# Patient Record
Sex: MALE | Race: WHITE | NOT HISPANIC OR LATINO | Employment: OTHER | URBAN - METROPOLITAN AREA
[De-identification: names, ages, dates, MRNs, and addresses within clinical notes are randomized per-mention and may not be internally consistent; named-entity substitution may affect disease eponyms.]

---

## 2017-02-07 DIAGNOSIS — M10.9 GOUTY ARTHROPATHY: ICD-10-CM

## 2017-02-07 RX ORDER — PREDNISONE 10 MG/1
TABLET ORAL
Qty: 40 TABLET | Refills: 0 | Status: SHIPPED | OUTPATIENT
Start: 2017-02-07 | End: 2017-03-16 | Stop reason: SDUPTHER

## 2017-03-07 DIAGNOSIS — M10.9 GOUTY ARTHROPATHY: ICD-10-CM

## 2017-03-08 RX ORDER — PREDNISONE 10 MG/1
TABLET ORAL
Qty: 40 TABLET | Refills: 0 | OUTPATIENT
Start: 2017-03-08

## 2017-03-16 DIAGNOSIS — M10.9 GOUTY ARTHROPATHY: ICD-10-CM

## 2017-03-16 RX ORDER — PREDNISONE 10 MG/1
TABLET ORAL
Qty: 40 TABLET | Refills: 0 | Status: SHIPPED | OUTPATIENT
Start: 2017-03-16 | End: 2017-07-12 | Stop reason: SDUPTHER

## 2017-03-16 NOTE — TELEPHONE ENCOUNTER
Patient states his gout has flares up very painful and swollen unable to walk on his foot requesting a refill on his Prednisone. Please advised.

## 2017-07-12 ENCOUNTER — OFFICE VISIT (OUTPATIENT)
Dept: FAMILY MEDICINE | Facility: CLINIC | Age: 37
End: 2017-07-12
Payer: COMMERCIAL

## 2017-07-12 VITALS
HEIGHT: 73 IN | WEIGHT: 268.5 LBS | TEMPERATURE: 98 F | BODY MASS INDEX: 35.59 KG/M2 | RESPIRATION RATE: 16 BRPM | DIASTOLIC BLOOD PRESSURE: 108 MMHG | OXYGEN SATURATION: 97 % | SYSTOLIC BLOOD PRESSURE: 144 MMHG | HEART RATE: 84 BPM

## 2017-07-12 DIAGNOSIS — M10.9 GOUTY ARTHROPATHY: ICD-10-CM

## 2017-07-12 DIAGNOSIS — M1A.0710 IDIOPATHIC CHRONIC GOUT OF RIGHT FOOT WITHOUT TOPHUS: Primary | ICD-10-CM

## 2017-07-12 DIAGNOSIS — R03.0 ELEVATED BP WITHOUT DIAGNOSIS OF HYPERTENSION: ICD-10-CM

## 2017-07-12 DIAGNOSIS — F51.01 PRIMARY INSOMNIA: ICD-10-CM

## 2017-07-12 PROCEDURE — 99999 PR PBB SHADOW E&M-EST. PATIENT-LVL III: CPT | Mod: PBBFAC,,, | Performed by: FAMILY MEDICINE

## 2017-07-12 PROCEDURE — 99214 OFFICE O/P EST MOD 30 MIN: CPT | Mod: S$GLB,,, | Performed by: FAMILY MEDICINE

## 2017-07-12 RX ORDER — COLCHICINE 0.6 MG/1
1 CAPSULE ORAL 2 TIMES DAILY
Refills: 11 | COMMUNITY
Start: 2017-05-24 | End: 2017-10-02 | Stop reason: SDUPTHER

## 2017-07-12 RX ORDER — FEBUXOSTAT 40 MG/1
40 TABLET ORAL DAILY
Refills: 12 | COMMUNITY
Start: 2017-05-24 | End: 2017-10-02 | Stop reason: SDUPTHER

## 2017-07-12 RX ORDER — TEMAZEPAM 30 MG/1
CAPSULE ORAL
Qty: 30 CAPSULE | Refills: 5 | Status: SHIPPED | OUTPATIENT
Start: 2017-07-12 | End: 2018-01-15 | Stop reason: SDUPTHER

## 2017-07-12 RX ORDER — PREDNISONE 10 MG/1
TABLET ORAL
Qty: 40 TABLET | Refills: 0 | Status: SHIPPED | OUTPATIENT
Start: 2017-07-12 | End: 2017-08-31

## 2017-07-12 RX ORDER — LOSARTAN POTASSIUM 25 MG/1
25 TABLET ORAL DAILY
Qty: 90 TABLET | Refills: 3 | Status: SHIPPED | OUTPATIENT
Start: 2017-07-12 | End: 2017-08-31

## 2017-07-12 NOTE — PROGRESS NOTES
Chief Complaint   Patient presents with    Gout     need medication refill        HPI  Keith Sommers Jr. is a 37 y.o. male with multiple medical diagnoses as listed in the medical history and problem list that presents to establish care.    Gout - states chronic, states small amounts of seafood causes flare ups, currently R ankle medial.     Anxiety - increased stressors with family, relationship.    Fam hx - HTN, CAD, no DM2, Lung CA, ovarian CA    Pt is known to me and was last seen by me on Visit date not found.    PAST MEDICAL HISTORY:  Past Medical History:   Diagnosis Date    Gout        PAST SURGICAL HISTORY:  Past Surgical History:   Procedure Laterality Date    HERNIA REPAIR         SOCIAL HISTORY:  Social History     Social History    Marital status: Single     Spouse name: N/A    Number of children: N/A    Years of education: N/A     Occupational History    Not on file.     Social History Main Topics    Smoking status: Never Smoker    Smokeless tobacco: Not on file    Alcohol use Yes      Comment: social. no alcohol tonight    Drug use: No    Sexual activity: Not on file     Other Topics Concern    Not on file     Social History Narrative    No narrative on file       FAMILY HISTORY:  No family history on file.    ALLERGIES AND MEDICATIONS: updated and reviewed.  Review of patient's allergies indicates:  No Known Allergies  Current Outpatient Prescriptions   Medication Sig Dispense Refill    MITIGARE 0.6 mg Cap Take 1 capsule by mouth 2 (two) times daily.  11    ULORIC 40 mg Tab Take 40 mg by mouth once daily.  12    colchicine 0.6 mg tablet Take 1 tablet (0.6 mg total) by mouth 2 (two) times daily. 60 tablet 11    ibuprofen (ADVIL,MOTRIN) 600 MG tablet Take 1 tablet (600 mg total) by mouth every 8 (eight) hours as needed for Pain. 20 tablet 0    losartan (COZAAR) 25 MG tablet Take 1 tablet (25 mg total) by mouth once daily. 90 tablet 3    predniSONE (DELTASONE) 10 MG tablet  "TAKE 3 TABLETS TWICE DAILY X 3 DAYS, THEN 2 TABLETS TWICE DAILY X 3 DAYS, THEN 1 TABLET TWICE DAILY X 3 DAYS,THEN 1 TABLET DAILY 40 tablet 0    temazepam (RESTORIL) 30 mg capsule TK 1 C PO NIGHTLY PRF INSOMNIA 30 capsule 5     No current facility-administered medications for this visit.        ROS  Review of Systems   Constitutional: Negative for activity change, fatigue and fever.   HENT: Negative for congestion, rhinorrhea and sore throat.    Eyes: Negative for visual disturbance.   Respiratory: Negative for cough, chest tightness and shortness of breath.    Cardiovascular: Negative for chest pain.   Gastrointestinal: Negative for abdominal pain, diarrhea, nausea and vomiting.   Genitourinary: Negative for dysuria, frequency and urgency.   Musculoskeletal: Positive for arthralgias. Negative for back pain and myalgias.   Skin: Negative for rash.   Neurological: Negative for dizziness, syncope and numbness.   Psychiatric/Behavioral: Negative for agitation. The patient is nervous/anxious.        Physical Exam  Vitals:    07/12/17 1545   BP: (!) 144/108   Pulse: 84   Resp: 16   Temp: 98.1 °F (36.7 °C)    Body mass index is 35.02 kg/m².  Weight: 121.8 kg (268 lb 8.3 oz)   Height: 6' 1.43" (186.5 cm)     Physical Exam   Constitutional: He is oriented to person, place, and time. He appears well-developed and well-nourished.   HENT:   Head: Normocephalic and atraumatic.   Eyes: Conjunctivae and EOM are normal. Pupils are equal, round, and reactive to light.   Neck: Normal range of motion. Neck supple.   Cardiovascular: Normal rate, regular rhythm and normal heart sounds.    Pulmonary/Chest: Effort normal and breath sounds normal.   Abdominal: Soft. Bowel sounds are normal.   Musculoskeletal: Normal range of motion.   Mild LE edema, ankle   Neurological: He is alert and oriented to person, place, and time. He has normal reflexes.   Skin: Skin is warm and dry.   Psychiatric: He has a normal mood and affect. His behavior is " normal. Judgment and thought content normal.       Health Maintenance       Date Due Completion Date    TETANUS VACCINE 06/17/1999 ---    Influenza Vaccine 08/01/2017 ---    Lipid Panel 10/18/2021 10/18/2016          Assessment & Plan    Idiopathic chronic gout of right foot without tophus, Gouty arthropathy  -     predniSONE (DELTASONE) 10 MG tablet; TAKE 3 TABLETS TWICE DAILY X 3 DAYS, THEN 2 TABLETS TWICE DAILY X 3 DAYS, THEN 1 TABLET TWICE DAILY X 3 DAYS,THEN 1 TABLET DAILY  Dispense: 40 tablet; Refill: 0    Primary insomnia  -     temazepam (RESTORIL) 30 mg capsule; TK 1 C PO NIGHTLY PRF INSOMNIA  Dispense: 30 capsule; Refill: 5  - Refilled previous medications    Elevated BP without diagnosis of hypertension  -     losartan (COZAAR) 25 MG tablet; Take 1 tablet (25 mg total) by mouth once daily.  Dispense: 90 tablet; Refill: 3  - Begin therapy, strong family hx of HTN, cardiac complications  - Discussed lifestyle mods        Return in about 4 weeks (around 8/9/2017).

## 2017-08-31 ENCOUNTER — HOSPITAL ENCOUNTER (OUTPATIENT)
Facility: HOSPITAL | Age: 37
Discharge: HOME | End: 2017-09-01
Attending: EMERGENCY MEDICINE | Admitting: SURGERY
Payer: COMMERCIAL

## 2017-08-31 ENCOUNTER — ANESTHESIA EVENT (OUTPATIENT)
Dept: SURGERY | Facility: HOSPITAL | Age: 37
End: 2017-08-31
Payer: COMMERCIAL

## 2017-08-31 ENCOUNTER — ANESTHESIA (OUTPATIENT)
Dept: SURGERY | Facility: HOSPITAL | Age: 37
End: 2017-08-31
Payer: COMMERCIAL

## 2017-08-31 DIAGNOSIS — K81.9 CHOLECYSTITIS: Primary | ICD-10-CM

## 2017-08-31 LAB
ALBUMIN SERPL BCP-MCNC: 4.3 G/DL
ALP SERPL-CCNC: 57 U/L
ALT SERPL W/O P-5'-P-CCNC: 49 U/L
ANION GAP SERPL CALC-SCNC: 9 MMOL/L
AST SERPL-CCNC: 27 U/L
BASOPHILS # BLD AUTO: 0.03 K/UL
BASOPHILS NFR BLD: 0.3 %
BILIRUB SERPL-MCNC: 1.4 MG/DL
BILIRUB UR QL STRIP: NEGATIVE
BUN SERPL-MCNC: 10 MG/DL
CALCIUM SERPL-MCNC: 9.6 MG/DL
CHLORIDE SERPL-SCNC: 104 MMOL/L
CLARITY UR: CLEAR
CO2 SERPL-SCNC: 28 MMOL/L
COLOR UR: YELLOW
CREAT SERPL-MCNC: 0.9 MG/DL
DIFFERENTIAL METHOD: ABNORMAL
EOSINOPHIL # BLD AUTO: 0.1 K/UL
EOSINOPHIL NFR BLD: 0.6 %
ERYTHROCYTE [DISTWIDTH] IN BLOOD BY AUTOMATED COUNT: 12 %
EST. GFR  (AFRICAN AMERICAN): >60 ML/MIN/1.73 M^2
EST. GFR  (NON AFRICAN AMERICAN): >60 ML/MIN/1.73 M^2
GLUCOSE SERPL-MCNC: 99 MG/DL
GLUCOSE UR QL STRIP: NEGATIVE
HCT VFR BLD AUTO: 41.9 %
HGB BLD-MCNC: 14.7 G/DL
HGB UR QL STRIP: NEGATIVE
KETONES UR QL STRIP: NEGATIVE
LEUKOCYTE ESTERASE UR QL STRIP: NEGATIVE
LIPASE SERPL-CCNC: 12 U/L
LYMPHOCYTES # BLD AUTO: 1.4 K/UL
LYMPHOCYTES NFR BLD: 13.1 %
MCH RBC QN AUTO: 32.8 PG
MCHC RBC AUTO-ENTMCNC: 35.1 G/DL
MCV RBC AUTO: 94 FL
MONOCYTES # BLD AUTO: 1.2 K/UL
MONOCYTES NFR BLD: 11.4 %
NEUTROPHILS # BLD AUTO: 8 K/UL
NEUTROPHILS NFR BLD: 74.6 %
NITRITE UR QL STRIP: NEGATIVE
PH UR STRIP: 7 [PH] (ref 5–8)
PLATELET # BLD AUTO: 139 K/UL
PMV BLD AUTO: 12.8 FL
POTASSIUM SERPL-SCNC: 4.2 MMOL/L
PROT SERPL-MCNC: 7.2 G/DL
PROT UR QL STRIP: NEGATIVE
RBC # BLD AUTO: 4.48 M/UL
SODIUM SERPL-SCNC: 141 MMOL/L
SP GR UR STRIP: 1.02 (ref 1–1.03)
URN SPEC COLLECT METH UR: ABNORMAL
UROBILINOGEN UR STRIP-ACNC: ABNORMAL EU/DL
WBC # BLD AUTO: 10.74 K/UL

## 2017-08-31 PROCEDURE — 83690 ASSAY OF LIPASE: CPT

## 2017-08-31 PROCEDURE — G0378 HOSPITAL OBSERVATION PER HR: HCPCS

## 2017-08-31 PROCEDURE — 96365 THER/PROPH/DIAG IV INF INIT: CPT

## 2017-08-31 PROCEDURE — 80053 COMPREHEN METABOLIC PANEL: CPT

## 2017-08-31 PROCEDURE — 96361 HYDRATE IV INFUSION ADD-ON: CPT

## 2017-08-31 PROCEDURE — 85025 COMPLETE CBC W/AUTO DIFF WBC: CPT

## 2017-08-31 PROCEDURE — 81003 URINALYSIS AUTO W/O SCOPE: CPT

## 2017-08-31 PROCEDURE — 63600175 PHARM REV CODE 636 W HCPCS

## 2017-08-31 PROCEDURE — 96374 THER/PROPH/DIAG INJ IV PUSH: CPT | Mod: 59

## 2017-08-31 PROCEDURE — 63600175 PHARM REV CODE 636 W HCPCS: Performed by: NURSE PRACTITIONER

## 2017-08-31 PROCEDURE — 27201423 OPTIME MED/SURG SUP & DEVICES STERILE SUPPLY: Performed by: SURGERY

## 2017-08-31 PROCEDURE — 36000708 HC OR TIME LEV III 1ST 15 MIN: Performed by: SURGERY

## 2017-08-31 PROCEDURE — 25000003 PHARM REV CODE 250: Performed by: STUDENT IN AN ORGANIZED HEALTH CARE EDUCATION/TRAINING PROGRAM

## 2017-08-31 PROCEDURE — 71000033 HC RECOVERY, INTIAL HOUR: Performed by: SURGERY

## 2017-08-31 PROCEDURE — 88304 TISSUE EXAM BY PATHOLOGIST: CPT | Performed by: PATHOLOGY

## 2017-08-31 PROCEDURE — 99285 EMERGENCY DEPT VISIT HI MDM: CPT | Mod: 25

## 2017-08-31 PROCEDURE — 36000709 HC OR TIME LEV III EA ADD 15 MIN: Performed by: SURGERY

## 2017-08-31 PROCEDURE — 63600175 PHARM REV CODE 636 W HCPCS: Performed by: SURGERY

## 2017-08-31 PROCEDURE — 37000008 HC ANESTHESIA 1ST 15 MINUTES: Performed by: SURGERY

## 2017-08-31 PROCEDURE — D9220A PRA ANESTHESIA: Mod: CRNA,,, | Performed by: NURSE ANESTHETIST, CERTIFIED REGISTERED

## 2017-08-31 PROCEDURE — 63600175 PHARM REV CODE 636 W HCPCS: Performed by: STUDENT IN AN ORGANIZED HEALTH CARE EDUCATION/TRAINING PROGRAM

## 2017-08-31 PROCEDURE — 71000039 HC RECOVERY, EACH ADD'L HOUR: Performed by: SURGERY

## 2017-08-31 PROCEDURE — 25000003 PHARM REV CODE 250: Performed by: NURSE PRACTITIONER

## 2017-08-31 PROCEDURE — 25000003 PHARM REV CODE 250: Performed by: NURSE ANESTHETIST, CERTIFIED REGISTERED

## 2017-08-31 PROCEDURE — 94799 UNLISTED PULMONARY SVC/PX: CPT

## 2017-08-31 PROCEDURE — 96375 TX/PRO/DX INJ NEW DRUG ADDON: CPT

## 2017-08-31 PROCEDURE — 63600175 PHARM REV CODE 636 W HCPCS: Performed by: NURSE ANESTHETIST, CERTIFIED REGISTERED

## 2017-08-31 PROCEDURE — D9220A PRA ANESTHESIA: Mod: ANES,,, | Performed by: ANESTHESIOLOGY

## 2017-08-31 PROCEDURE — 88304 TISSUE EXAM BY PATHOLOGIST: CPT | Mod: 26,,, | Performed by: PATHOLOGY

## 2017-08-31 PROCEDURE — 37000009 HC ANESTHESIA EA ADD 15 MINS: Performed by: SURGERY

## 2017-08-31 PROCEDURE — C9290 INJ, BUPIVACAINE LIPOSOME: HCPCS | Performed by: SURGERY

## 2017-08-31 PROCEDURE — 63600175 PHARM REV CODE 636 W HCPCS: Performed by: ANESTHESIOLOGY

## 2017-08-31 PROCEDURE — 25000003 PHARM REV CODE 250: Performed by: SURGERY

## 2017-08-31 PROCEDURE — S0028 INJECTION, FAMOTIDINE, 20 MG: HCPCS | Performed by: STUDENT IN AN ORGANIZED HEALTH CARE EDUCATION/TRAINING PROGRAM

## 2017-08-31 RX ORDER — MORPHINE SULFATE 10 MG/ML
2 INJECTION INTRAMUSCULAR; INTRAVENOUS; SUBCUTANEOUS
Status: DISCONTINUED | OUTPATIENT
Start: 2017-08-31 | End: 2017-09-01 | Stop reason: HOSPADM

## 2017-08-31 RX ORDER — DEXTROSE MONOHYDRATE, SODIUM CHLORIDE, AND POTASSIUM CHLORIDE 50; 1.49; 4.5 G/1000ML; G/1000ML; G/1000ML
INJECTION, SOLUTION INTRAVENOUS CONTINUOUS
Status: DISCONTINUED | OUTPATIENT
Start: 2017-08-31 | End: 2017-09-01

## 2017-08-31 RX ORDER — SODIUM CHLORIDE, SODIUM LACTATE, POTASSIUM CHLORIDE, CALCIUM CHLORIDE 600; 310; 30; 20 MG/100ML; MG/100ML; MG/100ML; MG/100ML
INJECTION, SOLUTION INTRAVENOUS CONTINUOUS PRN
Status: DISCONTINUED | OUTPATIENT
Start: 2017-08-31 | End: 2017-08-31

## 2017-08-31 RX ORDER — MORPHINE SULFATE 10 MG/ML
4 INJECTION INTRAMUSCULAR; INTRAVENOUS; SUBCUTANEOUS
Status: DISCONTINUED | OUTPATIENT
Start: 2017-08-31 | End: 2017-08-31

## 2017-08-31 RX ORDER — PROPOFOL 10 MG/ML
VIAL (ML) INTRAVENOUS
Status: DISCONTINUED | OUTPATIENT
Start: 2017-08-31 | End: 2017-08-31

## 2017-08-31 RX ORDER — LOSARTAN POTASSIUM 25 MG/1
25 TABLET ORAL DAILY
Status: DISCONTINUED | OUTPATIENT
Start: 2017-08-31 | End: 2017-09-01 | Stop reason: HOSPADM

## 2017-08-31 RX ORDER — METOCLOPRAMIDE HYDROCHLORIDE 5 MG/ML
10 INJECTION INTRAMUSCULAR; INTRAVENOUS EVERY 10 MIN PRN
Status: DISCONTINUED | OUTPATIENT
Start: 2017-08-31 | End: 2017-08-31 | Stop reason: HOSPADM

## 2017-08-31 RX ORDER — LIDOCAINE HCL/PF 100 MG/5ML
SYRINGE (ML) INTRAVENOUS
Status: DISCONTINUED | OUTPATIENT
Start: 2017-08-31 | End: 2017-08-31

## 2017-08-31 RX ORDER — HYDROMORPHONE HYDROCHLORIDE 2 MG/ML
0.2 INJECTION, SOLUTION INTRAMUSCULAR; INTRAVENOUS; SUBCUTANEOUS EVERY 5 MIN PRN
Status: DISCONTINUED | OUTPATIENT
Start: 2017-08-31 | End: 2017-08-31 | Stop reason: HOSPADM

## 2017-08-31 RX ORDER — NEOSTIGMINE METHYLSULFATE 1 MG/ML
INJECTION, SOLUTION INTRAVENOUS
Status: DISCONTINUED | OUTPATIENT
Start: 2017-08-31 | End: 2017-08-31

## 2017-08-31 RX ORDER — HYDRALAZINE HYDROCHLORIDE 20 MG/ML
10 INJECTION INTRAMUSCULAR; INTRAVENOUS ONCE
Status: COMPLETED | OUTPATIENT
Start: 2017-08-31 | End: 2017-08-31

## 2017-08-31 RX ORDER — ONDANSETRON 2 MG/ML
INJECTION INTRAMUSCULAR; INTRAVENOUS
Status: DISCONTINUED | OUTPATIENT
Start: 2017-08-31 | End: 2017-08-31

## 2017-08-31 RX ORDER — OXYCODONE AND ACETAMINOPHEN 5; 325 MG/1; MG/1
1 TABLET ORAL EVERY 4 HOURS PRN
Status: DISCONTINUED | OUTPATIENT
Start: 2017-08-31 | End: 2017-09-01 | Stop reason: HOSPADM

## 2017-08-31 RX ORDER — GLYCOPYRROLATE 0.2 MG/ML
INJECTION INTRAMUSCULAR; INTRAVENOUS
Status: DISCONTINUED | OUTPATIENT
Start: 2017-08-31 | End: 2017-08-31

## 2017-08-31 RX ORDER — FEBUXOSTAT 40 MG/1
40 TABLET, FILM COATED ORAL DAILY
Status: DISCONTINUED | OUTPATIENT
Start: 2017-08-31 | End: 2017-09-01 | Stop reason: HOSPADM

## 2017-08-31 RX ORDER — KETOROLAC TROMETHAMINE 30 MG/ML
INJECTION, SOLUTION INTRAMUSCULAR; INTRAVENOUS
Status: DISCONTINUED | OUTPATIENT
Start: 2017-08-31 | End: 2017-08-31

## 2017-08-31 RX ORDER — SODIUM CHLORIDE 9 MG/ML
1000 INJECTION, SOLUTION INTRAVENOUS
Status: COMPLETED | OUTPATIENT
Start: 2017-08-31 | End: 2017-08-31

## 2017-08-31 RX ORDER — MEPERIDINE HYDROCHLORIDE 50 MG/ML
12.5 INJECTION INTRAMUSCULAR; INTRAVENOUS; SUBCUTANEOUS ONCE AS NEEDED
Status: COMPLETED | OUTPATIENT
Start: 2017-08-31 | End: 2017-08-31

## 2017-08-31 RX ORDER — FAMOTIDINE 10 MG/ML
20 INJECTION INTRAVENOUS 2 TIMES DAILY
Status: DISCONTINUED | OUTPATIENT
Start: 2017-08-31 | End: 2017-09-01 | Stop reason: HOSPADM

## 2017-08-31 RX ORDER — ROCURONIUM BROMIDE 10 MG/ML
INJECTION, SOLUTION INTRAVENOUS
Status: DISCONTINUED | OUTPATIENT
Start: 2017-08-31 | End: 2017-08-31

## 2017-08-31 RX ORDER — ONDANSETRON 2 MG/ML
4 INJECTION INTRAMUSCULAR; INTRAVENOUS ONCE
Status: COMPLETED | OUTPATIENT
Start: 2017-08-31 | End: 2017-08-31

## 2017-08-31 RX ORDER — ACETAMINOPHEN 10 MG/ML
INJECTION, SOLUTION INTRAVENOUS
Status: DISCONTINUED | OUTPATIENT
Start: 2017-08-31 | End: 2017-08-31

## 2017-08-31 RX ORDER — ONDANSETRON 2 MG/ML
4 INJECTION INTRAMUSCULAR; INTRAVENOUS EVERY 6 HOURS PRN
Status: DISCONTINUED | OUTPATIENT
Start: 2017-08-31 | End: 2017-09-01 | Stop reason: HOSPADM

## 2017-08-31 RX ORDER — OXYCODONE AND ACETAMINOPHEN 5; 325 MG/1; MG/1
1 TABLET ORAL
Status: DISCONTINUED | OUTPATIENT
Start: 2017-08-31 | End: 2017-08-31 | Stop reason: HOSPADM

## 2017-08-31 RX ORDER — MIDAZOLAM HYDROCHLORIDE 1 MG/ML
INJECTION, SOLUTION INTRAMUSCULAR; INTRAVENOUS
Status: DISCONTINUED | OUTPATIENT
Start: 2017-08-31 | End: 2017-08-31

## 2017-08-31 RX ORDER — ONDANSETRON 2 MG/ML
4 INJECTION INTRAMUSCULAR; INTRAVENOUS
Status: COMPLETED | OUTPATIENT
Start: 2017-08-31 | End: 2017-08-31

## 2017-08-31 RX ORDER — BUPIVACAINE HYDROCHLORIDE 2.5 MG/ML
INJECTION, SOLUTION EPIDURAL; INFILTRATION; INTRACAUDAL
Status: DISCONTINUED | OUTPATIENT
Start: 2017-08-31 | End: 2017-08-31 | Stop reason: HOSPADM

## 2017-08-31 RX ORDER — SODIUM CHLORIDE 0.9 % (FLUSH) 0.9 %
3 SYRINGE (ML) INJECTION
Status: DISCONTINUED | OUTPATIENT
Start: 2017-08-31 | End: 2017-09-01 | Stop reason: HOSPADM

## 2017-08-31 RX ORDER — ENOXAPARIN SODIUM 100 MG/ML
40 INJECTION SUBCUTANEOUS EVERY 24 HOURS
Status: DISCONTINUED | OUTPATIENT
Start: 2017-09-01 | End: 2017-09-01 | Stop reason: HOSPADM

## 2017-08-31 RX ORDER — FENTANYL CITRATE 50 UG/ML
INJECTION, SOLUTION INTRAMUSCULAR; INTRAVENOUS
Status: DISCONTINUED | OUTPATIENT
Start: 2017-08-31 | End: 2017-08-31

## 2017-08-31 RX ORDER — MORPHINE SULFATE 10 MG/ML
6 INJECTION INTRAMUSCULAR; INTRAVENOUS; SUBCUTANEOUS
Status: COMPLETED | OUTPATIENT
Start: 2017-08-31 | End: 2017-08-31

## 2017-08-31 RX ORDER — ONDANSETRON 2 MG/ML
INJECTION INTRAMUSCULAR; INTRAVENOUS
Status: COMPLETED
Start: 2017-08-31 | End: 2017-08-31

## 2017-08-31 RX ADMIN — FENTANYL CITRATE 50 MCG: 50 INJECTION INTRAMUSCULAR; INTRAVENOUS at 02:08

## 2017-08-31 RX ADMIN — MEPERIDINE HYDROCHLORIDE 12.5 MG: 50 INJECTION INTRAMUSCULAR; INTRAVENOUS; SUBCUTANEOUS at 02:08

## 2017-08-31 RX ADMIN — KETOROLAC TROMETHAMINE 30 MG: 30 INJECTION, SOLUTION INTRAMUSCULAR; INTRAVENOUS at 02:08

## 2017-08-31 RX ADMIN — NEOSTIGMINE METHYLSULFATE 5 MG: 1 INJECTION INTRAVENOUS at 02:08

## 2017-08-31 RX ADMIN — HYDRALAZINE HYDROCHLORIDE 10 MG: 20 INJECTION INTRAMUSCULAR; INTRAVENOUS at 03:08

## 2017-08-31 RX ADMIN — FENTANYL CITRATE 50 MCG: 50 INJECTION INTRAMUSCULAR; INTRAVENOUS at 01:08

## 2017-08-31 RX ADMIN — ONDANSETRON 4 MG: 2 INJECTION, SOLUTION INTRAMUSCULAR; INTRAVENOUS at 02:08

## 2017-08-31 RX ADMIN — DEXTROSE, SODIUM CHLORIDE, AND POTASSIUM CHLORIDE: 5; .45; .15 INJECTION INTRAVENOUS at 05:08

## 2017-08-31 RX ADMIN — ONDANSETRON 4 MG: 2 INJECTION INTRAMUSCULAR; INTRAVENOUS at 06:08

## 2017-08-31 RX ADMIN — MIDAZOLAM HYDROCHLORIDE 4 MG: 1 INJECTION, SOLUTION INTRAMUSCULAR; INTRAVENOUS at 01:08

## 2017-08-31 RX ADMIN — SODIUM CHLORIDE 1000 ML: 0.9 INJECTION, SOLUTION INTRAVENOUS at 01:08

## 2017-08-31 RX ADMIN — SODIUM CHLORIDE 1000 ML: 0.9 INJECTION, SOLUTION INTRAVENOUS at 10:08

## 2017-08-31 RX ADMIN — PIPERACILLIN, TAZOBACTAM 4.5 G: 4; .5 INJECTION, POWDER, LYOPHILIZED, FOR SOLUTION INTRAVENOUS at 01:08

## 2017-08-31 RX ADMIN — SODIUM CHLORIDE, SODIUM LACTATE, POTASSIUM CHLORIDE, AND CALCIUM CHLORIDE: .6; .31; .03; .02 INJECTION, SOLUTION INTRAVENOUS at 01:08

## 2017-08-31 RX ADMIN — FENTANYL CITRATE 100 MCG: 50 INJECTION INTRAMUSCULAR; INTRAVENOUS at 01:08

## 2017-08-31 RX ADMIN — LIDOCAINE HYDROCHLORIDE 75 MG: 20 INJECTION, SOLUTION INTRAVENOUS at 02:08

## 2017-08-31 RX ADMIN — ACETAMINOPHEN 1000 MG: 10 INJECTION, SOLUTION INTRAVENOUS at 02:08

## 2017-08-31 RX ADMIN — ONDANSETRON 4 MG: 2 INJECTION, SOLUTION INTRAMUSCULAR; INTRAVENOUS at 04:08

## 2017-08-31 RX ADMIN — ROCURONIUM BROMIDE 40 MG: 10 INJECTION, SOLUTION INTRAVENOUS at 01:08

## 2017-08-31 RX ADMIN — GLYCOPYRROLATE 0.8 MG: 0.2 INJECTION, SOLUTION INTRAMUSCULAR; INTRAVENOUS at 02:08

## 2017-08-31 RX ADMIN — ONDANSETRON 4 MG: 2 INJECTION INTRAMUSCULAR; INTRAVENOUS at 11:08

## 2017-08-31 RX ADMIN — PROPOFOL 200 MG: 10 INJECTION, EMULSION INTRAVENOUS at 01:08

## 2017-08-31 RX ADMIN — FAMOTIDINE 20 MG: 10 INJECTION, SOLUTION INTRAVENOUS at 10:08

## 2017-08-31 RX ADMIN — LIDOCAINE HYDROCHLORIDE 100 MG: 20 INJECTION, SOLUTION INTRAVENOUS at 01:08

## 2017-08-31 RX ADMIN — ONDANSETRON 4 MG: 2 INJECTION INTRAMUSCULAR; INTRAVENOUS at 04:08

## 2017-08-31 RX ADMIN — MORPHINE SULFATE 6 MG: 10 INJECTION INTRAVENOUS at 11:08

## 2017-08-31 NOTE — OP NOTE
DATE OF PROCEDURE:  08/31/2017    PREOPERATIVE DIAGNOSIS:  Acute cholecystitis.    POSTOPERATIVE DIAGNOSIS:  Acute cholecystitis.    OPERATIVE PROCEDURE:  Laparoscopic cholecystectomy.    SURGEON:  Keith Rascon M.D.    ASSISTANT:  Jeannie.    PROCEDURE IN DETAIL:  After adequate premedication, the patient was taken to the   Operating Room, placed on the operating table in supine position.  General   anesthesia administered via endotracheal tube.  The patient's abdomen was   prepped and draped in sterile fashion.  Infraumbilical incision was made.  The   abdomen was grasped and elevated.  Veress needle inserted.  The abdomen   insufflated with CO2.  The 5-mm trocar introduced.  A laparoscope was placed.    The other 3 trocars placed in the usual position.  Gallbladder was grasped and   elevated, noted to have acute edematous changes.  The gallbladder was aspirated   and clear bile was returned.  At this point, the neck of the gallbladder was   completely skeletonized, identifying cystic duct and cystic artery.  Calot   triangle dissected.  No anatomic abnormalities were noted.  Cystic duct was   triply clipped and divided.  Cystic artery clipped and divided.  The gallbladder   removed from the gallbladder fossa utilizing cautery.  Gallbladder was removed   through the epigastric trocar site.  The operative site was irrigated.    Hemostasis was checked.  The abdomen desufflated.  The wounds closed with 3-0   nylon.  Blood loss was negligible.  The patient tolerated the procedure and was   transported to Recovery in stable condition.      VELVET/IN  dd: 08/31/2017 14:21:34 (CDT)  td: 08/31/2017 18:45:09 (EDINSON)  Doc ID   #[id number]  Job ID #[id number]    CC:

## 2017-08-31 NOTE — TRANSFER OF CARE
"Anesthesia Transfer of Care Note    Patient: Keith Sommers Jr.    Procedure(s) Performed: Procedure(s) (LRB):  CHOLECYSTECTOMY-LAPAROSCOPIC (N/A)    Patient location: PACU    Anesthesia Type: general    Transport from OR: Transported from OR on room air with adequate spontaneous ventilation    Post pain: adequate analgesia    Post assessment: no apparent anesthetic complications and tolerated procedure well    Post vital signs: stable    Level of consciousness: awake, alert and oriented    Nausea/Vomiting: no nausea/vomiting    Complications: none    Transfer of care protocol was followed      Last vitals:   Visit Vitals  BP (!) 159/92 (BP Location: Right arm, Patient Position: Lying)   Pulse 91   Temp 36.9 °C (98.4 °F) (Oral)   Resp 14   Ht 6' 1" (1.854 m)   Wt 117.9 kg (260 lb)   SpO2 100%   BMI 34.30 kg/m²     "

## 2017-08-31 NOTE — ANESTHESIA PREPROCEDURE EVALUATION
08/31/2017  Keith Sommers Jr. is a 37 y.o., male.    Anesthesia Evaluation    I have reviewed the Patient Summary Reports.     I have reviewed the Medications.     Review of Systems  Anesthesia Hx:  No problems with previous Anesthesia    Social:  Non-Smoker, No Alcohol Use    Cardiovascular:   Exercise tolerance: good Hypertension        Physical Exam  General:  Well nourished, Large Beard    Airway/Jaw/Neck:  Airway Findings: Mouth Opening: Normal Tongue: Normal  General Airway Assessment: Adult  Mallampati: II  TM Distance: Normal, at least 6 cm  Jaw/Neck Findings:  Neck ROM: Normal ROM      Dental:  Dental Findings: In tact   Chest/Lungs:  Chest/Lungs Findings: Clear to auscultation, Normal Respiratory Rate     Heart/Vascular:  Heart Findings: Rate: Normal        Mental Status:  Mental Status Findings:  Cooperative, Alert and Oriented         Anesthesia Plan  Type of Anesthesia, risks & benefits discussed:  Anesthesia Type:  general  Patient's Preference:   Intra-op Monitoring Plan: standard ASA monitors  Intra-op Monitoring Plan Comments:   Post Op Pain Control Plan: multimodal analgesia, IV/PO Opioids PRN and per primary service following discharge from PACU  Post Op Pain Control Plan Comments:   Induction:   IV  Beta Blocker:  Patient is not currently on a Beta-Blocker (No further documentation required).       Informed Consent: Patient understands risks and agrees with Anesthesia plan.  Questions answered. Anesthesia consent signed with patient.  ASA Score: 2     Day of Surgery Review of History & Physical:    H&P update referred to the surgeon.         Ready For Surgery From Anesthesia Perspective.

## 2017-08-31 NOTE — ED TRIAGE NOTES
Constant sharp right sided abdominal pain just below ribs sent here by urgent care  Vomited this morning nauseated

## 2017-08-31 NOTE — ED PROVIDER NOTES
"Encounter Date: 8/31/2017    SCRIBE #1 NOTE: I, Jared Reis, am scribing for, and in the presence of,  Azul Carreno NP. I have scribed the following portions of the note - Other sections scribed: HPI and ROS.       History     Chief Complaint   Patient presents with    Abdominal Pain     pain right below the RIGHT rib; urgent care stated patient needed to come to ED.      Chief Complaint: Abdominal Pain    HPI: This 37 y.o. Male with HTN, gout and diverticulitis presents to the ED c/o RUQ abdominal pain. Symptoms began suddenly at 8 pm last night. Patient originally attributed symptoms to red sauce eaten yesterday or gas. However, he states symptoms progressively worsened throughout the night. Pain is constant, severe and worse with palpation and positional movement. There's associated mild nausea secondary to pain and 1 episode of emesis this morning secondary to a "coughing fit". Symptoms were not improved with Aleve. No previous episodes of current symptoms. He denies fever, chills or diarrhea.     Patient reports history of significant EtOH use every day which he stopped 2 weeks ago.  Last alcohol intake was "couple beers" on Saturday.  There is also the reported a 35 pound weight loss recently.      The history is provided by the patient. No  was used.     Review of patient's allergies indicates:  No Known Allergies  Past Medical History:   Diagnosis Date    Diverticulitis     Gout     Hypertension      Past Surgical History:   Procedure Laterality Date    HERNIA REPAIR       History reviewed. No pertinent family history.  Social History   Substance Use Topics    Smoking status: Never Smoker    Smokeless tobacco: Never Used    Alcohol use No      Comment: social. no alcohol tonight     Review of Systems   Constitutional: Negative for chills and fever.   HENT: Negative for ear pain and sore throat.    Eyes: Negative for pain.   Respiratory: Negative for cough and shortness of " breath.    Cardiovascular: Negative for chest pain.   Gastrointestinal: Positive for abdominal pain, nausea and vomiting. Negative for diarrhea.   Musculoskeletal: Negative for myalgias (arm or leg pain).   Skin: Negative for rash.   Neurological: Negative for headaches.       Physical Exam     Initial Vitals [08/31/17 0915]   BP Pulse Resp Temp SpO2   (!) 168/99 65 18 97.7 °F (36.5 °C) 99 %      MAP       122         Physical Exam    Nursing note and vitals reviewed.  Constitutional: He appears well-developed and well-nourished.   HENT:   Head: Normocephalic.   Mouth/Throat: Oropharynx is clear and moist.   Eyes: Conjunctivae are normal.   Neck: Normal range of motion. Neck supple.   Cardiovascular: Normal rate, regular rhythm and normal heart sounds.   Pulmonary/Chest: Breath sounds normal.   Abdominal: Soft. Bowel sounds are normal. He exhibits no distension and no mass. There is tenderness. There is guarding. There is no rebound.   Tenderness over right upper quadrant.  Positive Garza sign.    Is no tenderness over McBurney's point.   Musculoskeletal: Normal range of motion.   Neurological: He is alert and oriented to person, place, and time.   Skin: Skin is warm and dry. Capillary refill takes less than 2 seconds.   Psychiatric: He has a normal mood and affect.         ED Course   Procedures  Labs Reviewed   CBC W/ AUTO DIFFERENTIAL - Abnormal; Notable for the following:        Result Value    RBC 4.48 (*)     MCH 32.8 (*)     Platelets 139 (*)     Gran # 8.0 (*)     Mono # 1.2 (*)     Gran% 74.6 (*)     Lymph% 13.1 (*)     All other components within normal limits   COMPREHENSIVE METABOLIC PANEL - Abnormal; Notable for the following:     Total Bilirubin 1.4 (*)     ALT 49 (*)     All other components within normal limits   URINALYSIS - Abnormal; Notable for the following:     Urobilinogen, UA 2.0-3.0 (*)     All other components within normal limits   LIPASE             Medical Decision Making:   Initial  Assessment:   37-year-old male presents with right upper quadrant pain since last p.m.  Differential Diagnosis:   Cholecystitis  Appendicitis  Renal colic  ED Management:  Diagnosis management comments: This is an urgent evaluation of a 37-year-old male that presented to the ER with c/o right upper quadrant pain since last night. Pts exam was as above.     Labs and ultrasound were reviewed and discussed with pt and wife.  Clinically patient has acute cholecystitis.    Discussed with [place], Dr Dennis, who will admit pt to Dr. Moss.     Normal saline and Zosyn given.    A total of 12 mg of morphine is been given to control patient's pain.      Case discussed with attending who agrees with assessment and plan.               Scribe Attestation:   Scribe #1: I performed the above scribed service and the documentation accurately describes the services I performed. I attest to the accuracy of the note.    Attending Attestation:           Physician Attestation for Scribe:  Physician Attestation Statement for Scribe #1: I, Azul Carreno NP, reviewed documentation, as scribed by Jared Reis in my presence, and it is both accurate and complete.                 ED Course     Clinical Impression:   The encounter diagnosis was Cholecystitis.    Disposition:   Disposition: Placed in Observation  Condition: Stable                        Azul Carreon NP  08/31/17 0714

## 2017-08-31 NOTE — OR NURSING
Anthony at surgery desk verified with dr burnett that pt is an extended recovery pt - he is to spend the night

## 2017-08-31 NOTE — BRIEF OP NOTE
[facility]  Surgery Department  Operative Note    SUMMARY     Date of Procedure: 8/31/2017     Procedure: Procedure(s) (LRB):  CHOLECYSTECTOMY-LAPAROSCOPIC (N/A)     Surgeon(s) and Role:     * Keith Rascon MD - Primary    Assisting Surgeon: None    Pre-Operative Diagnosis: Acute cholecystitis [K81.0]    Post-Operative Diagnosis: Post-Op Diagnosis Codes:     * Acute cholecystitis [K81.0]    Anesthesia: General    Technical Procedures Used: Laparoscopic cholecystectomy    Description of the Findings of the Procedure: same    Significant Surgical Tasks Conducted by the Assistant(s), if Applicable: none    Complications: No    Estimated Blood Loss (EBL): * No values recorded between 8/31/2017  1:47 PM and 8/31/2017  2:37 PM *           Implants: * No implants in log *    Specimens:   Specimen (12h ago through future)    Start     Ordered    08/31/17 1358  Specimen to Pathology - Surgery  Once     Comments:  Gallbladder      08/31/17 1416                  Condition: Good    Disposition: PACU - hemodynamically stable.    Attestation: I was present and scrubbed for the entire procedure.

## 2017-08-31 NOTE — H&P
"[facility]  History & Physical     Subjective:     Chief Complaint/Reason for Admission: abdominal pain    History of Present Illness:   Patient 37 y.o. male presents with acute onset RUQ pain, onset 14 hours ago, constant, worse with palpation, with associated nausea and emesis. Denies fever. No previous episodes.     Patient Active Problem List    Diagnosis Date Noted    Cholecystitis 08/31/2017    Idiopathic chronic gout of right foot 05/19/2016    Primary insomnia 05/19/2016        Prescriptions Prior to Admission   Medication Sig Dispense Refill Last Dose    ibuprofen (ADVIL,MOTRIN) 600 MG tablet Take 1 tablet (600 mg total) by mouth every 8 (eight) hours as needed for Pain. 20 tablet 0 Not Taking    MITIGARE 0.6 mg Cap Take 1 capsule by mouth 2 (two) times daily.  11 Taking    temazepam (RESTORIL) 30 mg capsule TK 1 C PO NIGHTLY PRF INSOMNIA 30 capsule 5     ULORIC 40 mg Tab Take 40 mg by mouth once daily.  12 Taking     Review of patient's allergies indicates:  No Known Allergies     Past Medical History:   Diagnosis Date    Diverticulitis     Gout     Hypertension       Past Surgical History:   Procedure Laterality Date    HERNIA REPAIR        History reviewed. No pertinent family history.   Social History   Substance Use Topics    Smoking status: Never Smoker    Smokeless tobacco: Never Used    Alcohol use No      Comment: social. no alcohol tonight        Review of Systems:  Pertinent items are noted in HPI.    OBJECTIVE:     Patient Vitals for the past 8 hrs:   BP Temp Temp src Pulse Resp SpO2 Height Weight   08/31/17 1230 (!) 146/80 97.7 °F (36.5 °C) - 62 18 96 % - -   08/31/17 0915 (!) 168/99 97.7 °F (36.5 °C) Oral 65 18 99 % 6' 1" (1.854 m) 117.9 kg (260 lb)     Mild distress  EOMI  NCAT  Alert and oriented  Normal affect  No scleral icterus  RRR  No increased work of breathing  Abdomen soft, non distended, tender to palpation RUQ, mild guarding  No MSK deformity  No " rash  No bleeding or bruising      Data Review:  CBC:   Recent Labs  Lab 08/31/17  1054   WBC 10.74   RBC 4.48*   HGB 14.7   HCT 41.9   *   MCV 94   MCH 32.8*   MCHC 35.1     CMP:   Recent Labs  Lab 08/31/17  1054   GLU 99   CALCIUM 9.6   ALBUMIN 4.3   PROT 7.2      K 4.2   CO2 28      BUN 10   CREATININE 0.9   ALKPHOS 57   ALT 49*   AST 27   BILITOT 1.4*     U/s: cholelithiasis with cholecystitis, normal CBD    ASSESSMENT/PLAN:   37M with acute cholecystitis, cholelithiasis  OR for lap sekou  NPO IVF Zosyn  Active Hospital Problems    Diagnosis  POA    Cholecystitis [K81.9]  Yes      Resolved Hospital Problems    Diagnosis Date Resolved POA   No resolved problems to display.

## 2017-09-01 VITALS
SYSTOLIC BLOOD PRESSURE: 151 MMHG | RESPIRATION RATE: 18 BRPM | HEIGHT: 73 IN | OXYGEN SATURATION: 98 % | BODY MASS INDEX: 34.47 KG/M2 | DIASTOLIC BLOOD PRESSURE: 92 MMHG | WEIGHT: 260.13 LBS | HEART RATE: 71 BPM | TEMPERATURE: 99 F

## 2017-09-01 PROBLEM — K81.9 CHOLECYSTITIS: Status: RESOLVED | Noted: 2017-08-31 | Resolved: 2017-09-01

## 2017-09-01 LAB
ALBUMIN SERPL BCP-MCNC: 3.2 G/DL
ALP SERPL-CCNC: 47 U/L
ALT SERPL W/O P-5'-P-CCNC: 77 U/L
ANION GAP SERPL CALC-SCNC: 8 MMOL/L
AST SERPL-CCNC: 59 U/L
BASOPHILS # BLD AUTO: 0.01 K/UL
BASOPHILS NFR BLD: 0.1 %
BILIRUB SERPL-MCNC: 1.4 MG/DL
BUN SERPL-MCNC: 11 MG/DL
CALCIUM SERPL-MCNC: 8.7 MG/DL
CHLORIDE SERPL-SCNC: 104 MMOL/L
CO2 SERPL-SCNC: 25 MMOL/L
CREAT SERPL-MCNC: 0.8 MG/DL
DIFFERENTIAL METHOD: ABNORMAL
EOSINOPHIL # BLD AUTO: 0 K/UL
EOSINOPHIL NFR BLD: 0.2 %
ERYTHROCYTE [DISTWIDTH] IN BLOOD BY AUTOMATED COUNT: 11.9 %
EST. GFR  (AFRICAN AMERICAN): >60 ML/MIN/1.73 M^2
EST. GFR  (NON AFRICAN AMERICAN): >60 ML/MIN/1.73 M^2
GLUCOSE SERPL-MCNC: 112 MG/DL
HCT VFR BLD AUTO: 38.9 %
HGB BLD-MCNC: 13.3 G/DL
LYMPHOCYTES # BLD AUTO: 1.7 K/UL
LYMPHOCYTES NFR BLD: 13 %
MCH RBC QN AUTO: 32 PG
MCHC RBC AUTO-ENTMCNC: 34.2 G/DL
MCV RBC AUTO: 94 FL
MONOCYTES # BLD AUTO: 1.5 K/UL
MONOCYTES NFR BLD: 11.6 %
NEUTROPHILS # BLD AUTO: 9.6 K/UL
NEUTROPHILS NFR BLD: 75.1 %
PLATELET # BLD AUTO: 121 K/UL
PMV BLD AUTO: 13.3 FL
POTASSIUM SERPL-SCNC: 3.7 MMOL/L
PROT SERPL-MCNC: 5.9 G/DL
RBC # BLD AUTO: 4.16 M/UL
SODIUM SERPL-SCNC: 137 MMOL/L
WBC # BLD AUTO: 12.73 K/UL

## 2017-09-01 PROCEDURE — 63600175 PHARM REV CODE 636 W HCPCS: Performed by: STUDENT IN AN ORGANIZED HEALTH CARE EDUCATION/TRAINING PROGRAM

## 2017-09-01 PROCEDURE — 36415 COLL VENOUS BLD VENIPUNCTURE: CPT

## 2017-09-01 PROCEDURE — 85025 COMPLETE CBC W/AUTO DIFF WBC: CPT

## 2017-09-01 PROCEDURE — 94799 UNLISTED PULMONARY SVC/PX: CPT

## 2017-09-01 PROCEDURE — 94761 N-INVAS EAR/PLS OXIMETRY MLT: CPT

## 2017-09-01 PROCEDURE — S0028 INJECTION, FAMOTIDINE, 20 MG: HCPCS | Performed by: STUDENT IN AN ORGANIZED HEALTH CARE EDUCATION/TRAINING PROGRAM

## 2017-09-01 PROCEDURE — G0378 HOSPITAL OBSERVATION PER HR: HCPCS

## 2017-09-01 PROCEDURE — 80053 COMPREHEN METABOLIC PANEL: CPT

## 2017-09-01 PROCEDURE — 25000003 PHARM REV CODE 250: Performed by: STUDENT IN AN ORGANIZED HEALTH CARE EDUCATION/TRAINING PROGRAM

## 2017-09-01 RX ORDER — KETOROLAC TROMETHAMINE 30 MG/ML
30 INJECTION, SOLUTION INTRAMUSCULAR; INTRAVENOUS EVERY 8 HOURS
Status: DISCONTINUED | OUTPATIENT
Start: 2017-09-01 | End: 2017-09-01 | Stop reason: HOSPADM

## 2017-09-01 RX ORDER — OXYCODONE AND ACETAMINOPHEN 5; 325 MG/1; MG/1
1 TABLET ORAL EVERY 4 HOURS PRN
Qty: 30 TABLET | Refills: 0 | Status: SHIPPED | OUTPATIENT
Start: 2017-09-01 | End: 2017-09-07

## 2017-09-01 RX ADMIN — KETOROLAC TROMETHAMINE 30 MG: 30 INJECTION, SOLUTION INTRAMUSCULAR at 01:09

## 2017-09-01 RX ADMIN — MORPHINE SULFATE 2 MG: 10 INJECTION INTRAVENOUS at 04:09

## 2017-09-01 RX ADMIN — ONDANSETRON 4 MG: 2 INJECTION INTRAMUSCULAR; INTRAVENOUS at 10:09

## 2017-09-01 RX ADMIN — FAMOTIDINE 20 MG: 10 INJECTION, SOLUTION INTRAVENOUS at 09:09

## 2017-09-01 RX ADMIN — DEXTROSE, SODIUM CHLORIDE, AND POTASSIUM CHLORIDE: 5; .45; .15 INJECTION INTRAVENOUS at 05:09

## 2017-09-01 NOTE — PLAN OF CARE
09/01/17 1505   Final Note   Assessment Type Final Discharge Note   Discharge Disposition Home   What phone number can be called within the next 1-3 days to see how you are doing after discharge? [phone]   Hospital Follow Up  Appt(s) scheduled? Yes   Discharge plans and expectations educations in teach back method with documentation complete? Yes   Right Care Referral Info   Post Acute Recommendation No Care

## 2017-09-01 NOTE — ANESTHESIA POSTPROCEDURE EVALUATION
"Anesthesia Post Evaluation    Patient: Keith Sommers     Procedure(s) Performed: Procedure(s) (LRB):  CHOLECYSTECTOMY-LAPAROSCOPIC (N/A)    [facility] Anesthesia Post Op Evaluation    Visit Vitals  /71 (BP Location: Right arm, Patient Position: Lying)   Pulse 81   Temp 37.4 °C (99.4 °F) (Oral)   Resp 18   Ht 6' 1" (1.854 m)   Wt 118 kg (260 lb 2.3 oz)   SpO2 95%   BMI 34.32 kg/m²       Pain/Chelsi Score: Pain Assessment Performed: Yes (8/31/2017  4:30 PM)  Presence of Pain: denies (8/31/2017  4:30 PM)  Pain Rating Prior to Med Admin: 8 (9/1/2017  4:38 AM)  Chelsi Score: 10 (8/31/2017  4:30 PM)      "

## 2017-09-01 NOTE — PLAN OF CARE
WRITTEN HEALTHCARE DISCHARGE INFORMATION     Things that YOU are responsible for to Manage Your Care At Home:  1. Getting your prescriptions filled.  2. Taking you medications as directed. DO NOT MISS ANY DOSES!  3. Going to your follow-up doctor appointments. This is important because it allows the doctor to monitor your progress and to determine if any changes need to be made to your treatment plan.    If you are unable to make your follow up appointments, please call the number listed and reschedule this appointment.     After discharge, if you need assistance, you can call [facility] for 24/7 assistance at [phone]    Thank you for choosing [facility] and allowing us to care for you.   From your care management team: Vickie ORDOÑEZ,RSW & Yulia MAHAJAN RN,TN [phone] or [phone]     You should receive a call from [facility] within 48-72 hours to help manage your care after discharge. Please try to make sure that you answer your phone for this important phone call.     Follow-up Information     Rodrigo Moss MD On 9/19/2017.    Specialties:  General Surgery, Bariatrics  Why:  For Hospital Follow up on Tuesday at 1:20pm  Contact information:  [address] [facility]  [address] [place] [address] SURGICAL [facility]  [place] [address]  [phone]

## 2017-09-01 NOTE — PLAN OF CARE
[facility] [id number]  [facility] - [address] AT [facility] [address]  [address]  [place] [address]  Phone: [phone] Fax: [phone]       09/01/17 1129   Discharge Assessment   Assessment Type Discharge Planning Assessment   Confirmed/corrected address and phone number on facesheet? Yes   Assessment information obtained from? Patient   Prior to hospitilization cognitive status: Alert/Oriented   Prior to hospitalization functional status: Independent   Current cognitive status: Alert/Oriented   Current Functional Status: Independent   Lives With significant other;child(darren), dependent   Able to Return to Prior Arrangements yes   Is patient able to care for self after discharge? Yes   Readmission Within The Last 30 Days no previous admission in last 30 days   Equipment Currently Used at Home none   Do you have any problems affording any of your prescribed medications? No   Is the patient taking medications as prescribed? yes   Does the patient have transportation home? Yes   Transportation Available car;family or friend will provide   Discharge Plan A Home with family   Discharge Plan B Home with family   Patient/Family In Agreement With Plan yes

## 2017-09-01 NOTE — ANESTHESIA POSTPROCEDURE EVALUATION
"Anesthesia Post Evaluation    Patient: Keith Sommers Jr.    Procedure(s) Performed: Procedure(s) (LRB):  CHOLECYSTECTOMY-LAPAROSCOPIC (N/A)    Final Anesthesia Type: general  Patient location during evaluation: PACU  Patient participation: Yes- Able to Participate  Level of consciousness: awake and alert and oriented  Post-procedure vital signs: reviewed and stable  Pain management: adequate  Airway patency: patent  PONV status at discharge: No PONV  Anesthetic complications: no      Cardiovascular status: blood pressure returned to baseline and hemodynamically stable  Respiratory status: unassisted, spontaneous ventilation and room air  Hydration status: euvolemic  Follow-up not needed.        Visit Vitals  /71 (BP Location: Right arm, Patient Position: Lying)   Pulse 81   Temp 37.4 °C (99.4 °F) (Oral)   Resp 18   Ht 6' 1" (1.854 m)   Wt 118 kg (260 lb 2.3 oz)   SpO2 95%   BMI 34.32 kg/m²       Pain/Chelsi Score: Pain Assessment Performed: Yes (8/31/2017  4:30 PM)  Presence of Pain: denies (8/31/2017  4:30 PM)  Pain Rating Prior to Med Admin: 8 (9/1/2017  4:38 AM)  Chelsi Score: 10 (8/31/2017  4:30 PM)      "

## 2017-09-01 NOTE — NURSING
D/c instructions given to patient, follow-up appts explained, pain medication Rx given. Explained showering, no taking baths, make sure to dry incisions well. IV d/c'd, cath tip intact. Pt tolerated well. Explained when to contact MD if necessary. Pt able to walk self out with family. PT in no distress.

## 2017-09-01 NOTE — PLAN OF CARE
09/01/17 0809   Patient Assessment/Suction   Level of Consciousness (AVPU) alert   All Lung Fields Breath Sounds clear   PRE-TX-O2-ETCO2   O2 Device (Oxygen Therapy) room air   SpO2 97 %   Pulse Oximetry Type Intermittent   $ Pulse Oximetry - Multiple Charge Pulse Oximetry - Multiple   Pulse 88   Resp 18   Incentive Spirometer   $ Incentive Spirometer Charges done with encouragement   Administration (Incentive Spirometer) done with encouragement   Number of Repetitions (Incentive Spirometer) 10   Level (mL) (Incentive Spirometer) 1000   Patient Tolerance good

## 2017-09-01 NOTE — PROGRESS NOTES
"SW met with pt at bedside. SW reviewed discharge education sheet " GI Disorders and Post Op surgery " with pt. Pt voiced understanding. Teachback method applied with pt. SW also reviewed with pt what she is responsible for in order to manage her health at home.     1) Getting medications taking from pharmacy.  2) Taking medications as prescribed.  3) Making Appointments that are scheduled.    Pt verbalized understanding.     "

## 2017-09-01 NOTE — PROGRESS NOTES
[facility]  General Surgery  Progress Note    Subjective:     Interval History: notes some incisional pain, nausea. +flatus, tolerating liquids.     Post-Op Info:  Procedure(s) (LRB):  CHOLECYSTECTOMY-LAPAROSCOPIC (N/A)   1 Day Post-Op      Medications:  Continuous Infusions:   dextrose 5 % and 0.45 % NaCl with KCl 20 mEq 75 mL/hr at 09/01/17 0539     Scheduled Meds:   enoxaparin  40 mg Subcutaneous Daily    famotidine (PF)  20 mg Intravenous BID    febuxostat  40 mg Oral Daily    ketorolac  30 mg Intravenous Q8H    losartan  25 mg Oral Daily     PRN Meds:morphine, ondansetron, oxycodone-acetaminophen, sodium chloride 0.9%     Objective:     Vital Signs (Most Recent):  Temp: 98.7 °F (37.1 °C) (09/01/17 1149)  Pulse: 71 (09/01/17 1149)  Resp: 18 (09/01/17 1149)  BP: (!) 151/92 (09/01/17 1149)  SpO2: 98 % (09/01/17 1149) Vital Signs (24h Range):  Temp:  [98 °F (36.7 °C)-99.4 °F (37.4 °C)] 98.7 °F (37.1 °C)  Pulse:  [56-98] 71  Resp:  [10-25] 18  SpO2:  [94 %-100 %] 98 %  BP: (130-183)/() 151/92       Intake/Output Summary (Last 24 hours) at 09/01/17 1406  Last data filed at 09/01/17 0600   Gross per 24 hour   Intake             1720 ml   Output                0 ml   Net             1720 ml       Physical Exam  No distress  Abdomen soft, moderately tender to palpation, dressings in place, cdi    Significant Labs:  BMP:   Recent Labs  Lab 09/01/17  0731   *      K 3.7      CO2 25   BUN 11   CREATININE 0.8   CALCIUM 8.7     CBC:   Recent Labs  Lab 09/01/17  0731   WBC 12.73*   RBC 4.16*   HGB 13.3*   HCT 38.9*   *   MCV 94   MCH 32.0*   MCHC 34.2       Significant Diagnostics:  None    Assessment/Plan:   37M with cholecystitis s/p lap sekou POD1  Add toradol  Advance diet, dc IVF  Home later today  Active Diagnoses:    Diagnosis Date Noted POA    Cholecystitis [K81.9] 08/31/2017 Yes      Problems Resolved During this Admission:    Diagnosis Date Noted Date  Resolved POA         Payton Dennis MD  General Surgery  [facility]

## 2017-09-01 NOTE — DISCHARGE SUMMARY
[facility]  Discharge Summary      Admit Date: 8/31/2017    Discharge Date and Time:  09/01/2017 3:22 PM    Attending Physician: Rodrigo Moss MD     Reason for Admission: Acute cholecystitis     Procedures Performed: Procedure(s) (LRB):  CHOLECYSTECTOMY-LAPAROSCOPIC (N/A)    Hospital Course (synopsis of major diagnoses, care, treatment, and services provided during the course of the hospital stay): Presented to the ED with abdominal pain, leukocytosis. Ultrasound demonstrated cholelithiasis and cholecystitis. He was taken to the OR for lap cholecystectomy, which he tolerated well. His post operative course was unremarkable, with return of bowel function. Pain was well managed.     Consults: none    Significant Diagnostic Studies: Ultrasound    Final Diagnoses:    Principal Problem: <principal problem not specified>   Secondary Diagnoses:   Active Hospital Problems    Diagnosis  POA   No active problems to display.      Resolved Hospital Problems    Diagnosis Date Resolved POA    Cholecystitis [K81.9] 09/01/2017 Yes       Discharged Condition: good    Disposition: Home or Self Care    Follow Up/Patient Instructions:   Ok to remove dressings and shower tomorrow  Do not soak  Do not lift > 10lbs x 4 weeks, light activity as tolerated  Diet as tolerated  Call MD with fever, chills, nausea, vomiting, wound breakdown, jaundice  Medications:  Reconciled Home Medications:   Current Discharge Medication List      START taking these medications    Details   oxycodone-acetaminophen (PERCOCET) 5-325 mg per tablet Take 1 tablet by mouth every 4 (four) hours as needed.  Qty: 30 tablet, Refills: 0         CONTINUE these medications which have NOT CHANGED    Details   ibuprofen (ADVIL,MOTRIN) 600 MG tablet Take 1 tablet (600 mg total) by mouth every 8 (eight) hours as needed for Pain.  Qty: 20 tablet, Refills: 0      MITIGARE 0.6 mg Cap Take 1 capsule by mouth 2 (two) times daily.  Refills: 11      temazepam  (RESTORIL) 30 mg capsule TK 1 C PO NIGHTLY PRF INSOMNIA  Qty: 30 capsule, Refills: 5    Associated Diagnoses: Primary insomnia      ULORIC 40 mg Tab Take 40 mg by mouth once daily.  Refills: 12         STOP taking these medications       losartan (COZAAR) 25 MG tablet Comments:   Reason for Stopping:         predniSONE (DELTASONE) 10 MG tablet Comments:   Reason for Stopping:             No discharge procedures on file.  Follow-up Information     Keith Rascon MD. Schedule an appointment as soon as possible for a visit in 1 week.    Specialty:  General Surgery  Contact information:  [address] [place] [address] [phone]

## 2017-09-01 NOTE — PLAN OF CARE
Problem: Patient Care Overview  Goal: Plan of Care Review  Outcome: Ongoing (interventions implemented as appropriate)   08/31/17 3805   Coping/Psychosocial   Plan Of Care Reviewed With patient       Comments:  Patient BP WNL for patient.  Patient admits to being noncompliant with home med due to stomach upset.  Patient refused oral meds due.  Nausea covered with IV zofran.  Will continue to monitor.

## 2017-09-06 DIAGNOSIS — M10.9 GOUTY ARTHROPATHY: ICD-10-CM

## 2017-09-06 RX ORDER — FEBUXOSTAT 40 MG/1
TABLET ORAL
Qty: 30 TABLET | OUTPATIENT
Start: 2017-09-06

## 2017-09-06 RX ORDER — COLCHICINE 0.6 MG/1
CAPSULE ORAL
Qty: 60 CAPSULE | OUTPATIENT
Start: 2017-09-06

## 2017-09-07 ENCOUNTER — OFFICE VISIT (OUTPATIENT)
Dept: FAMILY MEDICINE | Facility: CLINIC | Age: 37
End: 2017-09-07
Payer: COMMERCIAL

## 2017-09-07 VITALS
BODY MASS INDEX: 34.51 KG/M2 | DIASTOLIC BLOOD PRESSURE: 98 MMHG | SYSTOLIC BLOOD PRESSURE: 140 MMHG | WEIGHT: 260.38 LBS | OXYGEN SATURATION: 97 % | RESPIRATION RATE: 16 BRPM | HEIGHT: 73 IN | TEMPERATURE: 97 F | HEART RATE: 72 BPM

## 2017-09-07 DIAGNOSIS — Z90.49 S/P CHOLECYSTECTOMY: Primary | ICD-10-CM

## 2017-09-07 PROCEDURE — 99999 PR PBB SHADOW E&M-EST. PATIENT-LVL III: CPT | Mod: PBBFAC,,, | Performed by: FAMILY MEDICINE

## 2017-09-07 PROCEDURE — 3008F BODY MASS INDEX DOCD: CPT | Mod: S$GLB,,, | Performed by: FAMILY MEDICINE

## 2017-09-07 PROCEDURE — 99214 OFFICE O/P EST MOD 30 MIN: CPT | Mod: S$GLB,,, | Performed by: FAMILY MEDICINE

## 2017-09-07 NOTE — PROGRESS NOTES
Chief Complaint   Patient presents with    Hospital Follow Up     following emergency surgery last Thurs       HPI  Keith Sommers Jr. is a 37 y.o. male with multiple medical diagnoses as listed in the medical history and problem list that presents for ER follow up.      R thoracic lateral pain, worse with activity, mild at this time s/p cholecystectomy . Denies fever, N/V at this time.    Pt is known to me and was last seen by me on 7/12/2017.    PAST MEDICAL HISTORY:  Past Medical History:   Diagnosis Date    Diverticulitis     Gout     Hypertension        PAST SURGICAL HISTORY:  Past Surgical History:   Procedure Laterality Date    CHOLECYSTECTOMY      HERNIA REPAIR         SOCIAL HISTORY:  Social History     Social History    Marital status: Single     Spouse name: N/A    Number of children: N/A    Years of education: N/A     Occupational History    Not on file.     Social History Main Topics    Smoking status: Never Smoker    Smokeless tobacco: Never Used    Alcohol use No      Comment: social. no alcohol tonight    Drug use: No    Sexual activity: Yes     Partners: Female      Comment: In relationship 9/7/17     Other Topics Concern    Not on file     Social History Narrative    No narrative on file       FAMILY HISTORY:  History reviewed. No pertinent family history.    ALLERGIES AND MEDICATIONS: updated and reviewed.  Review of patient's allergies indicates:  No Known Allergies  Current Outpatient Prescriptions   Medication Sig Dispense Refill    MITIGARE 0.6 mg Cap Take 1 capsule by mouth 2 (two) times daily.  11    temazepam (RESTORIL) 30 mg capsule TK 1 C PO NIGHTLY PRF INSOMNIA 30 capsule 5    ULORIC 40 mg Tab Take 40 mg by mouth once daily.  12    ibuprofen (ADVIL,MOTRIN) 600 MG tablet Take 1 tablet (600 mg total) by mouth every 8 (eight) hours as needed for Pain. 20 tablet 0     No current facility-administered medications for this visit.        ROS  Review of Systems  "  Constitutional: Negative for activity change, fatigue and fever.   HENT: Negative for congestion, rhinorrhea and sore throat.    Eyes: Negative for visual disturbance.   Respiratory: Negative for cough, chest tightness and shortness of breath.    Cardiovascular: Negative for chest pain.   Gastrointestinal: Negative for abdominal pain, diarrhea, nausea and vomiting.   Genitourinary: Negative for dysuria, frequency and urgency.   Musculoskeletal: Positive for arthralgias and back pain. Negative for myalgias.   Skin: Negative for rash.   Neurological: Negative for dizziness, syncope and numbness.   Psychiatric/Behavioral: Negative for agitation. The patient is nervous/anxious.        Physical Exam  Vitals:    09/07/17 1535   BP: (!) 140/98   Pulse: 72   Resp: 16   Temp: 97.4 °F (36.3 °C)    Body mass index is 34.35 kg/m².  Weight: 118.1 kg (260 lb 5.8 oz)   Height: 6' 1" (185.4 cm)     Physical Exam   Constitutional: He is oriented to person, place, and time. He appears well-developed and well-nourished.   HENT:   Head: Normocephalic.   Eyes: EOM are normal.   Neck: Normal range of motion.   Neurological: He is alert and oriented to person, place, and time.   Skin: Skin is warm and dry.   Psychiatric: He has a normal mood and affect. His behavior is normal. Judgment and thought content normal.   Nursing note and vitals reviewed.      Health Maintenance       Date Due Completion Date    TETANUS VACCINE 06/17/1999 ---    Influenza Vaccine 08/01/2017 ---    Lipid Panel 10/18/2021 10/18/2016          Assessment & Plan    S/P cholecystectomy  - Continue current medication regimen as prescribed  - Cont conservative therapy  - Wounds healing well          Return in about 4 weeks (around 10/5/2017).          "

## 2017-10-02 ENCOUNTER — TELEPHONE (OUTPATIENT)
Dept: FAMILY MEDICINE | Facility: CLINIC | Age: 37
End: 2017-10-02

## 2017-10-02 RX ORDER — COLCHICINE 0.6 MG/1
1 CAPSULE ORAL 2 TIMES DAILY
Qty: 60 CAPSULE | Refills: 6 | Status: SHIPPED | OUTPATIENT
Start: 2017-10-02 | End: 2017-11-15 | Stop reason: SDUPTHER

## 2017-10-02 RX ORDER — FEBUXOSTAT 40 MG/1
40 TABLET ORAL DAILY
Qty: 30 TABLET | Refills: 6 | Status: SHIPPED | OUTPATIENT
Start: 2017-10-02 | End: 2017-11-15 | Stop reason: SDUPTHER

## 2017-10-02 NOTE — TELEPHONE ENCOUNTER
----- Message from Zuri Bejarano sent at 10/2/2017  2:23 PM CDT -----  Patient is calling again. States we sent medications to the incorrect pharmacy.     He needs below medications sent to [facility]:  MITIGARE 0.6 mg Cap  ULORIC 40 mg Tab    He needs the refill for predniSONE (DELTASONE) 10 MG tablet  sent to [facility] in [place].     Patient can be reached at [phone]. Thank you!

## 2017-10-02 NOTE — TELEPHONE ENCOUNTER
----- Message from Ciera Duran sent at 10/2/2017  9:32 AM CDT -----  Contact: [phone]  Pt is requesting refills on MITIGARE 0.6 mg Cap,ULORIC 40 mg Tab please [facility] [address] [facility]. Pt is requesting a refill for the protozoan sent to [id number] - [place], [place] - [address] AT [facility] [address]

## 2017-10-20 ENCOUNTER — OFFICE VISIT (OUTPATIENT)
Dept: FAMILY MEDICINE | Facility: CLINIC | Age: 37
End: 2017-10-20
Payer: COMMERCIAL

## 2017-10-20 ENCOUNTER — TELEPHONE (OUTPATIENT)
Dept: FAMILY MEDICINE | Facility: CLINIC | Age: 37
End: 2017-10-20

## 2017-10-20 VITALS
BODY MASS INDEX: 35.58 KG/M2 | DIASTOLIC BLOOD PRESSURE: 100 MMHG | OXYGEN SATURATION: 97 % | SYSTOLIC BLOOD PRESSURE: 142 MMHG | HEIGHT: 73 IN | TEMPERATURE: 98 F | WEIGHT: 268.44 LBS | HEART RATE: 58 BPM

## 2017-10-20 DIAGNOSIS — J02.0 STREPTOCOCCAL SORE THROAT: ICD-10-CM

## 2017-10-20 DIAGNOSIS — J02.9 SORE THROAT: Primary | ICD-10-CM

## 2017-10-20 LAB — DEPRECATED S PYO AG THROAT QL EIA: NEGATIVE

## 2017-10-20 PROCEDURE — 87880 STREP A ASSAY W/OPTIC: CPT | Mod: PO

## 2017-10-20 PROCEDURE — 99214 OFFICE O/P EST MOD 30 MIN: CPT | Mod: S$GLB,,, | Performed by: NURSE PRACTITIONER

## 2017-10-20 PROCEDURE — 99999 PR PBB SHADOW E&M-EST. PATIENT-LVL IV: CPT | Mod: PBBFAC,,, | Performed by: NURSE PRACTITIONER

## 2017-10-20 PROCEDURE — 87081 CULTURE SCREEN ONLY: CPT

## 2017-10-20 RX ORDER — AMOXICILLIN 875 MG/1
875 TABLET, FILM COATED ORAL 2 TIMES DAILY
Qty: 20 TABLET | Refills: 0 | Status: SHIPPED | OUTPATIENT
Start: 2017-10-20 | End: 2017-10-30

## 2017-10-20 NOTE — TELEPHONE ENCOUNTER
----- Message from Karen Sim sent at 10/20/2017  6:10 AM CDT -----  Contact: pt  Pt  Dad called in said pt is real sick said cant talk cause his throat swollen, said has fever and chills wants to see the dr today please call them at [phone]

## 2017-10-20 NOTE — TELEPHONE ENCOUNTER
Per patient's father, patient will keep appointment scheduled for today 10/20/2017.  Will schedule an appointment with Dr. Melendez (when he returns) if needed.

## 2017-10-20 NOTE — TELEPHONE ENCOUNTER
----- Message from Floridalma Bah sent at 10/20/2017  8:52 AM CDT -----  Patient's father(Keith Lobato) is returning Mello's call. He can be reached at [phone].

## 2017-10-20 NOTE — PROGRESS NOTES
Subjective:       Patient ID: Keith Sommers Jr. is a 37 y.o. male.    Chief Complaint: Sore Throat (3 days)    37-year-old male presents to the clinic today with complaint of a sore throat for the past 3 days.  He states it's hard to swallow.  His son has the same symptoms A doctor at this time.  He has mild sinus congestion and a slight cough.  He denies any fever, chills, ear pain, wheezing, shortness breath, abdominal pain, nausea, vomiting, diarrhea, headaches, dizziness, or blurred vision.  He denies any cardiac chest pain, heart palpitations, shortness breath, or swelling to lower extremities.      Past Medical History:   Diagnosis Date    Diverticulitis     Gout     Hypertension      Past Surgical History:   Procedure Laterality Date    CHOLECYSTECTOMY      HERNIA REPAIR        reports that he has never smoked. He has never used smokeless tobacco. He reports that he does not drink alcohol or use drugs.  Review of Systems   Constitutional: Negative for chills, fatigue and fever.   HENT: Positive for congestion and sore throat. Negative for ear discharge, ear pain, nosebleeds, postnasal drip, rhinorrhea, sinus pressure and sneezing.    Respiratory: Positive for cough. Negative for shortness of breath and wheezing.    Cardiovascular: Negative for chest pain, palpitations and leg swelling.   Gastrointestinal: Negative for abdominal pain, constipation, diarrhea, nausea and vomiting.   Musculoskeletal: Negative for back pain, gait problem and neck pain.   Skin: Negative for color change and rash.   Neurological: Negative for dizziness, light-headedness and headaches.       Objective:      Physical Exam   Constitutional: He is oriented to person, place, and time. He appears well-developed and well-nourished. No distress.   HENT:   Head: Normocephalic and atraumatic.   Right Ear: External ear normal.   Left Ear: External ear normal.   Nose: Nose normal.   Mouth/Throat: No oropharyngeal exudate.   Pharynx  red and inflamed no exudate    Eyes: Conjunctivae and EOM are normal. Pupils are equal, round, and reactive to light. Right eye exhibits no discharge. Left eye exhibits no discharge. No scleral icterus.   Neck: Normal range of motion. Neck supple. No JVD present. No thyromegaly present.   Bilateral cervical adenopathy    Cardiovascular: Normal rate and regular rhythm.  Exam reveals no gallop and no friction rub.    No murmur heard.  Pulmonary/Chest: Effort normal and breath sounds normal. No respiratory distress. He has no wheezes. He has no rales.   Abdominal: Soft. Bowel sounds are normal. There is no tenderness. There is no rebound and no guarding.   Musculoskeletal: Normal range of motion. He exhibits no edema.   Lymphadenopathy:     He has cervical adenopathy.   Neurological: He is alert and oriented to person, place, and time. He displays normal reflexes.   Skin: Skin is warm and dry. No rash noted. He is not diaphoretic.   Psychiatric: He has a normal mood and affect. His behavior is normal. Judgment and thought content normal.       Assessment:       1. Sore throat    2. Streptococcal sore throat        Plan:         Sore throat  -     Throat Screen, Rapid    Streptococcal sore throat  -     amoxicillin (AMOXIL) 875 MG tablet; Take 1 tablet (875 mg total) by mouth 2 (two) times daily.  Dispense: 20 tablet; Refill: 0    Other orders  -     Strep A culture, throat      I spoke with the patient and told him that is strep screen was negative and he said his son was diagnosed with strept this am. I told him that I would treat him.

## 2017-10-20 NOTE — TELEPHONE ENCOUNTER
Left message for patient or patient's father to call office.  Need to verify if patient will keep appointment scheduled or is patient requesting a new appointment.

## 2017-10-20 NOTE — PATIENT INSTRUCTIONS
Continue Mucinex   Take Delsym   Take plain Claritin   Strep screen pending    Start Cozaar   Follow up with Dr. Melendez in 2 weeks for a blood pressure check up   Tylenol and Advil as needed for pain and/or fever

## 2017-10-23 ENCOUNTER — TELEPHONE (OUTPATIENT)
Dept: FAMILY MEDICINE | Facility: CLINIC | Age: 37
End: 2017-10-23

## 2017-10-23 LAB — BACTERIA THROAT CULT: NORMAL

## 2017-10-23 NOTE — TELEPHONE ENCOUNTER
----- Message from Tamika Altman sent at 10/20/2017 12:45 PM CDT -----  Contact: self  Patient is calling for test results because his son has tested for Strep-throat. Patient can be reached at [phone].        Thanks,

## 2017-11-15 ENCOUNTER — OFFICE VISIT (OUTPATIENT)
Dept: FAMILY MEDICINE | Facility: CLINIC | Age: 37
End: 2017-11-15
Payer: COMMERCIAL

## 2017-11-15 VITALS
SYSTOLIC BLOOD PRESSURE: 144 MMHG | TEMPERATURE: 98 F | DIASTOLIC BLOOD PRESSURE: 110 MMHG | HEART RATE: 86 BPM | RESPIRATION RATE: 18 BRPM | WEIGHT: 266.13 LBS | OXYGEN SATURATION: 97 % | BODY MASS INDEX: 35.27 KG/M2 | HEIGHT: 73 IN

## 2017-11-15 DIAGNOSIS — M10.9 GOUTY ARTHROPATHY: ICD-10-CM

## 2017-11-15 DIAGNOSIS — J02.8 ACUTE BACTERIAL PHARYNGITIS: ICD-10-CM

## 2017-11-15 DIAGNOSIS — R19.7 DIARRHEA, UNSPECIFIED TYPE: ICD-10-CM

## 2017-11-15 DIAGNOSIS — I10 HYPERTENSION, UNSPECIFIED TYPE: ICD-10-CM

## 2017-11-15 DIAGNOSIS — M1A.0710 IDIOPATHIC CHRONIC GOUT OF RIGHT FOOT WITHOUT TOPHUS: Primary | ICD-10-CM

## 2017-11-15 DIAGNOSIS — Z90.49 HISTORY OF LAPAROSCOPIC CHOLECYSTECTOMY: ICD-10-CM

## 2017-11-15 DIAGNOSIS — B96.89 ACUTE BACTERIAL PHARYNGITIS: ICD-10-CM

## 2017-11-15 PROCEDURE — 99999 PR PBB SHADOW E&M-EST. PATIENT-LVL III: CPT | Mod: PBBFAC,,, | Performed by: FAMILY MEDICINE

## 2017-11-15 PROCEDURE — 99214 OFFICE O/P EST MOD 30 MIN: CPT | Mod: S$GLB,,, | Performed by: FAMILY MEDICINE

## 2017-11-15 RX ORDER — COLCHICINE 0.6 MG/1
1 CAPSULE ORAL 3 TIMES DAILY
Qty: 90 CAPSULE | Refills: 6 | Status: SHIPPED | OUTPATIENT
Start: 2017-11-15 | End: 2018-11-13 | Stop reason: SDUPTHER

## 2017-11-15 RX ORDER — AMLODIPINE BESYLATE 5 MG/1
5 TABLET ORAL DAILY
Qty: 30 TABLET | Refills: 3 | Status: SHIPPED | OUTPATIENT
Start: 2017-11-15 | End: 2018-04-18 | Stop reason: SDUPTHER

## 2017-11-15 RX ORDER — AMOXICILLIN AND CLAVULANATE POTASSIUM 500; 125 MG/1; MG/1
1 TABLET, FILM COATED ORAL 2 TIMES DAILY
Qty: 14 TABLET | Refills: 0 | Status: SHIPPED | OUTPATIENT
Start: 2017-11-15 | End: 2019-01-14

## 2017-11-15 RX ORDER — LOSARTAN POTASSIUM 25 MG/1
TABLET ORAL
Refills: 3 | COMMUNITY
Start: 2017-10-20 | End: 2017-11-15

## 2017-11-15 RX ORDER — FEBUXOSTAT 40 MG/1
40 TABLET ORAL DAILY
Qty: 30 TABLET | Refills: 6 | Status: SHIPPED | OUTPATIENT
Start: 2017-11-15

## 2017-11-15 RX ORDER — LOSARTAN POTASSIUM AND HYDROCHLOROTHIAZIDE 12.5; 5 MG/1; MG/1
1 TABLET ORAL DAILY
Qty: 90 TABLET | Refills: 3 | Status: SHIPPED | OUTPATIENT
Start: 2017-11-15 | End: 2017-11-15

## 2017-11-15 RX ORDER — PREDNISONE 10 MG/1
TABLET ORAL
Qty: 40 TABLET | Refills: 0 | Status: SHIPPED | OUTPATIENT
Start: 2017-11-15 | End: 2018-01-15 | Stop reason: SDUPTHER

## 2017-11-15 NOTE — PROGRESS NOTES
Chief Complaint   Patient presents with    Sore Throat     couple days level 8 pain    Gout     in left middle toe       HPI  Keith Sommers Jr. is a 37 y.o. male with multiple medical diagnoses as listed in the medical history and problem list that presents for URI.    URI - son dx with strept P, also given abx, symptoms improved except for cough, dry. +strept also dx in step son.     Gout - L 2nd toe pain, medications not improved.      HTN - compliant with medications    S/p lap sekou - 2.5 months, now with 3 weeks of diarrhea, watery/loose BMs.     Pt is known to me and was last seen by me on 9/7/2017.    PAST MEDICAL HISTORY:  Past Medical History:   Diagnosis Date    Diverticulitis     Gout     Hypertension        PAST SURGICAL HISTORY:  Past Surgical History:   Procedure Laterality Date    CHOLECYSTECTOMY      HERNIA REPAIR         SOCIAL HISTORY:  Social History     Social History    Marital status: Single     Spouse name: N/A    Number of children: N/A    Years of education: N/A     Occupational History    Not on file.     Social History Main Topics    Smoking status: Never Smoker    Smokeless tobacco: Never Used    Alcohol use No      Comment: social. no alcohol tonight    Drug use: No    Sexual activity: Yes     Partners: Female      Comment: In relationship 9/7/17     Other Topics Concern    Not on file     Social History Narrative    No narrative on file       FAMILY HISTORY:  History reviewed. No pertinent family history.    ALLERGIES AND MEDICATIONS: updated and reviewed.  Review of patient's allergies indicates:  No Known Allergies  Current Outpatient Prescriptions   Medication Sig Dispense Refill    MITIGARE 0.6 mg Cap Take 1 capsule (0.6 mg total) by mouth 3 (three) times daily. 90 capsule 6    temazepam (RESTORIL) 30 mg capsule TK 1 C PO NIGHTLY PRF INSOMNIA 30 capsule 5    ULORIC 40 mg Tab Take 1 tablet (40 mg total) by mouth once daily. 30 tablet 6    amLODIPine  "(NORVASC) 5 MG tablet Take 1 tablet (5 mg total) by mouth once daily. 30 tablet 3    amoxicillin-clavulanate 500-125mg (AUGMENTIN) 500-125 mg Tab Take 1 tablet (500 mg total) by mouth 2 (two) times daily. 14 tablet 0    predniSONE (DELTASONE) 10 MG tablet TAKE 3 TABLETS TWICE DAILY X 3 DAYS, THEN 2 TABLETS TWICE DAILY X 3 DAYS, THEN 1 TABLET TWICE DAILY X 3 DAYS,THEN 1 TABLET DAILY 40 tablet 0     No current facility-administered medications for this visit.        ROS  Review of Systems   Constitutional: Negative for activity change, appetite change and fever.   HENT: Positive for congestion, postnasal drip, rhinorrhea, sinus pressure and sneezing.    Eyes: Positive for itching.   Respiratory: Positive for cough. Negative for shortness of breath and wheezing.    Cardiovascular: Negative for chest pain.   Gastrointestinal: Negative for abdominal pain, diarrhea and nausea.   Endocrine: Negative for polydipsia.   Genitourinary: Negative for dysuria.   Musculoskeletal: Positive for gait problem and joint swelling. Negative for neck stiffness.   Skin: Negative for rash.   Neurological: Negative for numbness.   Psychiatric/Behavioral: Negative for agitation and dysphoric mood.       Physical Exam  Vitals:    11/15/17 1628   BP: (!) 144/110   Pulse: 86   Resp: 18   Temp: 98.4 °F (36.9 °C)    Body mass index is 35.11 kg/m².  Weight: 120.7 kg (266 lb 1.5 oz)   Height: 6' 1" (185.4 cm)     Physical Exam   Constitutional: He is oriented to person, place, and time. He appears well-developed and well-nourished.   HENT:   Head: Normocephalic.   Mouth/Throat: Oropharyngeal exudate present.   Erythematous pharynx, +exudate  Erythematous, edematous nares  Mild dull TMs bilaterally   Eyes: Pupils are equal, round, and reactive to light. No scleral icterus.   Neck: Normal range of motion. Neck supple.   Cardiovascular: Normal rate, regular rhythm and normal heart sounds.    Pulmonary/Chest: Effort normal and breath sounds normal. No " respiratory distress.   Abdominal: Soft. Bowel sounds are normal. There is no tenderness.   Musculoskeletal:   L foot - 2nd toe increased TTP, dec ROM, mild edema   Lymphadenopathy:     He has cervical adenopathy.   Neurological: He is alert and oriented to person, place, and time.   Skin: Skin is warm. No rash noted.   Psychiatric: He has a normal mood and affect. His behavior is normal. Judgment and thought content normal.       Health Maintenance       Date Due Completion Date    TETANUS VACCINE 06/17/1999 ---    Influenza Vaccine 08/01/2017 ---    Lipid Panel 10/18/2021 10/18/2016          Assessment & Plan    Idiopathic chronic gout of right foot without tophus  -     ULORIC 40 mg Tab; Take 1 tablet (40 mg total) by mouth once daily.  Dispense: 30 tablet; Refill: 6  -     MITIGARE 0.6 mg Cap; Take 1 capsule (0.6 mg total) by mouth 3 (three) times daily.  Dispense: 90 capsule; Refill: 6  - Pt states was on mitigare TID, will trial    Gouty arthropathy  -     predniSONE (DELTASONE) 10 MG tablet; TAKE 3 TABLETS TWICE DAILY X 3 DAYS, THEN 2 TABLETS TWICE DAILY X 3 DAYS, THEN 1 TABLET TWICE DAILY X 3 DAYS,THEN 1 TABLET DAILY  Dispense: 40 tablet; Refill: 0    Hypertension, unspecified type  -     losartan-hydrochlorothiazide 50-12.5 mg (HYZAAR) 50-12.5 mg per tablet; Take 1 tablet by mouth once daily.  Dispense: 90 tablet; Refill: 3  -     amLODIPine (NORVASC) 5 MG tablet; Take 1 tablet (5 mg total) by mouth once daily.  Dispense: 30 tablet; Refill: 3  - Continue current medication regimen as prescribed    Acute bacterial pharyngitis  -     amoxicillin-clavulanate 500-125mg (AUGMENTIN) 500-125 mg Tab; Take 1 tablet (500 mg total) by mouth 2 (two) times daily.  Dispense: 14 tablet; Refill: 0  - Treat at this time    Diarrhea, unspecified type, History of laparoscopic cholecystectomy  - Monitor closely  - May require f/u with GI at this time        Return in about 4 weeks (around 12/13/2017), or if symptoms worsen or  fail to improve.

## 2017-11-16 ENCOUNTER — TELEPHONE (OUTPATIENT)
Dept: FAMILY MEDICINE | Facility: CLINIC | Age: 37
End: 2017-11-16

## 2017-11-16 NOTE — TELEPHONE ENCOUNTER
Wilberto stated that maximum dose on the below medication stating that it is 2 capsules daily, if provider want to change the sig or stay the same

## 2017-11-16 NOTE — TELEPHONE ENCOUNTER
Isidro)notified of the below, stated the patient profile history shows the patient taking BID in the past. Please advise, thank you.

## 2017-11-16 NOTE — TELEPHONE ENCOUNTER
----- Message from Rosa Isela Turner sent at 11/16/2017 11:07 AM CST -----  Contact: Wilberto With [facility]   Calling for a prescription verification on --- MITIGARE 0.6 mg Cap---.   Pharmacy's call back [phone]

## 2017-11-16 NOTE — TELEPHONE ENCOUNTER
He was placed on 3 per day by his urologist in the past. We can keep him on that 3 per day for now.

## 2017-11-17 ENCOUNTER — PATIENT MESSAGE (OUTPATIENT)
Dept: FAMILY MEDICINE | Facility: CLINIC | Age: 37
End: 2017-11-17

## 2018-01-15 DIAGNOSIS — M10.9 GOUTY ARTHROPATHY: ICD-10-CM

## 2018-01-15 DIAGNOSIS — F51.01 PRIMARY INSOMNIA: ICD-10-CM

## 2018-01-15 RX ORDER — PREDNISONE 10 MG/1
TABLET ORAL
Qty: 40 TABLET | Refills: 0 | Status: SHIPPED | OUTPATIENT
Start: 2018-01-15 | End: 2018-07-09 | Stop reason: SDUPTHER

## 2018-01-15 RX ORDER — TEMAZEPAM 30 MG/1
CAPSULE ORAL
Qty: 30 CAPSULE | Refills: 0 | Status: SHIPPED | OUTPATIENT
Start: 2018-01-15 | End: 2018-03-16 | Stop reason: SDUPTHER

## 2018-01-15 RX ORDER — PREDNISONE 10 MG/1
TABLET ORAL
Qty: 40 TABLET | Refills: 0 | Status: SHIPPED | OUTPATIENT
Start: 2018-01-15 | End: 2018-05-22 | Stop reason: SDUPTHER

## 2018-01-15 NOTE — TELEPHONE ENCOUNTER
----- Message from Shayy Garza sent at 1/15/2018  9:35 AM CST -----  Contact: Keith [phone]  Refill: predniSONE (DELTASONE) 10 MG tablet  Pharmacy: [id number]  [place] - [address] AT [facility] [address]

## 2018-03-16 DIAGNOSIS — F51.01 PRIMARY INSOMNIA: ICD-10-CM

## 2018-03-16 RX ORDER — TEMAZEPAM 30 MG/1
CAPSULE ORAL
Qty: 30 CAPSULE | Refills: 0 | Status: SHIPPED | OUTPATIENT
Start: 2018-03-16 | End: 2018-04-18 | Stop reason: SDUPTHER

## 2018-04-18 DIAGNOSIS — I10 HYPERTENSION, UNSPECIFIED TYPE: ICD-10-CM

## 2018-04-18 DIAGNOSIS — F51.01 PRIMARY INSOMNIA: ICD-10-CM

## 2018-04-20 RX ORDER — TEMAZEPAM 30 MG/1
CAPSULE ORAL
Qty: 30 CAPSULE | Refills: 0 | Status: SHIPPED | OUTPATIENT
Start: 2018-04-20 | End: 2018-05-22 | Stop reason: SDUPTHER

## 2018-04-20 RX ORDER — AMLODIPINE BESYLATE 5 MG/1
TABLET ORAL
Qty: 30 TABLET | Refills: 0 | Status: SHIPPED | OUTPATIENT
Start: 2018-04-20 | End: 2018-05-22 | Stop reason: SDUPTHER

## 2018-05-22 DIAGNOSIS — F51.01 PRIMARY INSOMNIA: ICD-10-CM

## 2018-05-22 DIAGNOSIS — M10.9 GOUTY ARTHROPATHY: ICD-10-CM

## 2018-05-22 DIAGNOSIS — I10 HYPERTENSION, UNSPECIFIED TYPE: ICD-10-CM

## 2018-05-22 RX ORDER — TEMAZEPAM 30 MG/1
CAPSULE ORAL
Qty: 30 CAPSULE | Refills: 0 | Status: SHIPPED | OUTPATIENT
Start: 2018-05-22 | End: 2018-07-03 | Stop reason: SDUPTHER

## 2018-05-22 RX ORDER — PREDNISONE 10 MG/1
TABLET ORAL
Qty: 40 TABLET | Refills: 0 | Status: SHIPPED | OUTPATIENT
Start: 2018-05-22 | End: 2018-07-27 | Stop reason: SDUPTHER

## 2018-05-22 RX ORDER — AMLODIPINE BESYLATE 5 MG/1
TABLET ORAL
Qty: 30 TABLET | Refills: 0 | Status: SHIPPED | OUTPATIENT
Start: 2018-05-22 | End: 2018-07-03 | Stop reason: SDUPTHER

## 2018-07-03 DIAGNOSIS — M10.9 GOUTY ARTHROPATHY: ICD-10-CM

## 2018-07-03 DIAGNOSIS — F51.01 PRIMARY INSOMNIA: ICD-10-CM

## 2018-07-03 DIAGNOSIS — I10 HYPERTENSION, UNSPECIFIED TYPE: ICD-10-CM

## 2018-07-06 RX ORDER — PREDNISONE 10 MG/1
TABLET ORAL
Qty: 40 TABLET | Refills: 0 | OUTPATIENT
Start: 2018-07-06

## 2018-07-06 RX ORDER — AMLODIPINE BESYLATE 5 MG/1
TABLET ORAL
Qty: 30 TABLET | Refills: 0 | Status: SHIPPED | OUTPATIENT
Start: 2018-07-06 | End: 2018-08-13 | Stop reason: SDUPTHER

## 2018-07-06 RX ORDER — TEMAZEPAM 30 MG/1
CAPSULE ORAL
Qty: 30 CAPSULE | Refills: 0 | Status: SHIPPED | OUTPATIENT
Start: 2018-07-06 | End: 2018-08-13 | Stop reason: SDUPTHER

## 2018-07-09 DIAGNOSIS — M10.9 GOUTY ARTHROPATHY: ICD-10-CM

## 2018-07-09 RX ORDER — PREDNISONE 10 MG/1
TABLET ORAL
Qty: 40 TABLET | Refills: 0 | Status: SHIPPED | OUTPATIENT
Start: 2018-07-09 | End: 2018-12-28 | Stop reason: SDUPTHER

## 2018-07-27 DIAGNOSIS — M10.9 GOUTY ARTHROPATHY: ICD-10-CM

## 2018-07-27 RX ORDER — PREDNISONE 10 MG/1
TABLET ORAL
Qty: 40 TABLET | Refills: 0 | Status: SHIPPED | OUTPATIENT
Start: 2018-07-27 | End: 2018-09-23 | Stop reason: SDUPTHER

## 2018-07-27 NOTE — TELEPHONE ENCOUNTER
Spoke with pt he states that he is in [place] and does not have his medication for gout and his foot has been swollen and painful for one day. Also states he is in a lot of pain.

## 2018-07-27 NOTE — TELEPHONE ENCOUNTER
----- Message from Inocente Kelley sent at 7/27/2018 10:04 AM CDT -----  Contact: spouse /[phone]  Calling To see if something can get called in for gout flare up,that Pt's experiencing now  while on the road traveling . Pt would like to get it called into a local pharm,to get some relief

## 2018-08-13 DIAGNOSIS — F51.01 PRIMARY INSOMNIA: ICD-10-CM

## 2018-08-13 DIAGNOSIS — I10 HYPERTENSION, UNSPECIFIED TYPE: ICD-10-CM

## 2018-08-15 RX ORDER — AMLODIPINE BESYLATE 5 MG/1
TABLET ORAL
Qty: 30 TABLET | Refills: 0 | Status: SHIPPED | OUTPATIENT
Start: 2018-08-15 | End: 2018-09-23 | Stop reason: SDUPTHER

## 2018-08-15 RX ORDER — TEMAZEPAM 30 MG/1
CAPSULE ORAL
Qty: 30 CAPSULE | Refills: 0 | Status: SHIPPED | OUTPATIENT
Start: 2018-08-15 | End: 2018-09-23 | Stop reason: SDUPTHER

## 2018-09-23 DIAGNOSIS — F51.01 PRIMARY INSOMNIA: ICD-10-CM

## 2018-09-23 DIAGNOSIS — M10.9 GOUTY ARTHROPATHY: ICD-10-CM

## 2018-09-23 DIAGNOSIS — I10 HYPERTENSION, UNSPECIFIED TYPE: ICD-10-CM

## 2018-09-24 RX ORDER — PREDNISONE 10 MG/1
TABLET ORAL
Qty: 40 TABLET | Refills: 0 | Status: SHIPPED | OUTPATIENT
Start: 2018-09-24 | End: 2019-01-14

## 2018-09-24 RX ORDER — TEMAZEPAM 30 MG/1
CAPSULE ORAL
Qty: 30 CAPSULE | Refills: 0 | Status: SHIPPED | OUTPATIENT
Start: 2018-09-24 | End: 2018-11-06 | Stop reason: SDUPTHER

## 2018-09-24 RX ORDER — AMLODIPINE BESYLATE 5 MG/1
TABLET ORAL
Qty: 30 TABLET | Refills: 0 | Status: SHIPPED | OUTPATIENT
Start: 2018-09-24 | End: 2018-11-06 | Stop reason: SDUPTHER

## 2018-11-06 DIAGNOSIS — F51.01 PRIMARY INSOMNIA: ICD-10-CM

## 2018-11-06 DIAGNOSIS — I10 HYPERTENSION, UNSPECIFIED TYPE: ICD-10-CM

## 2018-11-13 DIAGNOSIS — M1A.0710 IDIOPATHIC CHRONIC GOUT OF RIGHT FOOT WITHOUT TOPHUS: ICD-10-CM

## 2018-11-13 RX ORDER — TEMAZEPAM 30 MG/1
CAPSULE ORAL
Qty: 30 CAPSULE | Refills: 0 | Status: SHIPPED | OUTPATIENT
Start: 2018-11-13 | End: 2018-12-19 | Stop reason: SDUPTHER

## 2018-11-13 RX ORDER — AMLODIPINE BESYLATE 5 MG/1
TABLET ORAL
Qty: 30 TABLET | Refills: 0 | Status: SHIPPED | OUTPATIENT
Start: 2018-11-13 | End: 2018-12-19 | Stop reason: SDUPTHER

## 2018-11-14 RX ORDER — COLCHICINE 0.6 MG/1
1 CAPSULE ORAL 3 TIMES DAILY
Qty: 270 CAPSULE | Refills: 0 | Status: SHIPPED | OUTPATIENT
Start: 2018-11-14

## 2018-12-19 ENCOUNTER — PATIENT MESSAGE (OUTPATIENT)
Dept: FAMILY MEDICINE | Facility: CLINIC | Age: 38
End: 2018-12-19

## 2018-12-19 DIAGNOSIS — I10 HYPERTENSION, UNSPECIFIED TYPE: ICD-10-CM

## 2018-12-19 DIAGNOSIS — F51.01 PRIMARY INSOMNIA: ICD-10-CM

## 2018-12-19 DIAGNOSIS — M10.9 GOUTY ARTHROPATHY: ICD-10-CM

## 2018-12-19 RX ORDER — TEMAZEPAM 30 MG/1
CAPSULE ORAL
Qty: 30 CAPSULE | Refills: 0 | Status: SHIPPED | OUTPATIENT
Start: 2018-12-19 | End: 2019-01-14 | Stop reason: SDUPTHER

## 2018-12-19 RX ORDER — PREDNISONE 10 MG/1
TABLET ORAL
Qty: 40 TABLET | Refills: 0 | OUTPATIENT
Start: 2018-12-19

## 2018-12-19 RX ORDER — AMLODIPINE BESYLATE 5 MG/1
TABLET ORAL
Qty: 30 TABLET | Refills: 0 | Status: SHIPPED | OUTPATIENT
Start: 2018-12-19 | End: 2019-01-14 | Stop reason: SDUPTHER

## 2018-12-20 ENCOUNTER — TELEPHONE (OUTPATIENT)
Dept: FAMILY MEDICINE | Facility: CLINIC | Age: 38
End: 2018-12-20

## 2018-12-20 NOTE — TELEPHONE ENCOUNTER
Advised patient that Rx is ready for pickup. Patient also advised that an office visit is required for further refills. LOV 11/16/2017.

## 2018-12-20 NOTE — TELEPHONE ENCOUNTER
Patient stated he is not having a gout flare up at this time. Patient stated that his insurance will be changing at the beginning of the year and he wanted to have the medication on hand when he need it so he does not have to pay a higher cost later.

## 2018-12-28 DIAGNOSIS — M10.9 GOUTY ARTHROPATHY: ICD-10-CM

## 2018-12-28 RX ORDER — PREDNISONE 10 MG/1
TABLET ORAL
Qty: 40 TABLET | Refills: 0 | Status: SHIPPED | OUTPATIENT
Start: 2018-12-28 | End: 2019-02-11 | Stop reason: SDUPTHER

## 2019-01-12 DIAGNOSIS — I10 HYPERTENSION, UNSPECIFIED TYPE: ICD-10-CM

## 2019-01-12 RX ORDER — AMLODIPINE BESYLATE 5 MG/1
TABLET ORAL
Qty: 30 TABLET | Refills: 0 | Status: CANCELLED | OUTPATIENT
Start: 2019-01-12

## 2019-01-14 ENCOUNTER — LAB VISIT (OUTPATIENT)
Dept: LAB | Facility: HOSPITAL | Age: 39
End: 2019-01-14
Attending: FAMILY MEDICINE
Payer: COMMERCIAL

## 2019-01-14 ENCOUNTER — OFFICE VISIT (OUTPATIENT)
Dept: FAMILY MEDICINE | Facility: CLINIC | Age: 39
End: 2019-01-14
Payer: COMMERCIAL

## 2019-01-14 VITALS
HEIGHT: 73 IN | TEMPERATURE: 98 F | BODY MASS INDEX: 39.1 KG/M2 | WEIGHT: 295 LBS | HEART RATE: 52 BPM | OXYGEN SATURATION: 98 % | RESPIRATION RATE: 20 BRPM | SYSTOLIC BLOOD PRESSURE: 134 MMHG | DIASTOLIC BLOOD PRESSURE: 90 MMHG

## 2019-01-14 DIAGNOSIS — M1A.0710 IDIOPATHIC CHRONIC GOUT OF RIGHT FOOT WITHOUT TOPHUS: Primary | ICD-10-CM

## 2019-01-14 DIAGNOSIS — F51.01 PRIMARY INSOMNIA: ICD-10-CM

## 2019-01-14 DIAGNOSIS — I10 HYPERTENSION, UNSPECIFIED TYPE: ICD-10-CM

## 2019-01-14 LAB
ALBUMIN SERPL BCP-MCNC: 3.8 G/DL
ALP SERPL-CCNC: 74 U/L
ALT SERPL W/O P-5'-P-CCNC: 58 U/L
ANION GAP SERPL CALC-SCNC: 8 MMOL/L
AST SERPL-CCNC: 33 U/L
BASOPHILS # BLD AUTO: 0.07 K/UL
BASOPHILS NFR BLD: 0.9 %
BILIRUB SERPL-MCNC: 0.7 MG/DL
BUN SERPL-MCNC: 9 MG/DL
CALCIUM SERPL-MCNC: 9 MG/DL
CHLORIDE SERPL-SCNC: 105 MMOL/L
CHOLEST SERPL-MCNC: 203 MG/DL
CHOLEST/HDLC SERPL: 5 {RATIO}
CO2 SERPL-SCNC: 29 MMOL/L
CREAT SERPL-MCNC: 0.8 MG/DL
DIFFERENTIAL METHOD: ABNORMAL
EOSINOPHIL # BLD AUTO: 0.2 K/UL
EOSINOPHIL NFR BLD: 3.2 %
ERYTHROCYTE [DISTWIDTH] IN BLOOD BY AUTOMATED COUNT: 11.7 %
EST. GFR  (AFRICAN AMERICAN): >60 ML/MIN/1.73 M^2
EST. GFR  (NON AFRICAN AMERICAN): >60 ML/MIN/1.73 M^2
GLUCOSE SERPL-MCNC: 96 MG/DL
HCT VFR BLD AUTO: 43.1 %
HDLC SERPL-MCNC: 41 MG/DL
HDLC SERPL: 20.2 %
HGB BLD-MCNC: 14.5 G/DL
IMM GRANULOCYTES # BLD AUTO: 0.03 K/UL
IMM GRANULOCYTES NFR BLD AUTO: 0.4 %
LDLC SERPL CALC-MCNC: 120.8 MG/DL
LYMPHOCYTES # BLD AUTO: 2.9 K/UL
LYMPHOCYTES NFR BLD: 38.6 %
MCH RBC QN AUTO: 30.8 PG
MCHC RBC AUTO-ENTMCNC: 33.6 G/DL
MCV RBC AUTO: 92 FL
MONOCYTES # BLD AUTO: 1.1 K/UL
MONOCYTES NFR BLD: 13.8 %
NEUTROPHILS # BLD AUTO: 3.3 K/UL
NEUTROPHILS NFR BLD: 43.1 %
NONHDLC SERPL-MCNC: 162 MG/DL
NRBC BLD-RTO: 0 /100 WBC
PLATELET # BLD AUTO: 162 K/UL
PMV BLD AUTO: 12.9 FL
POTASSIUM SERPL-SCNC: 4.1 MMOL/L
PROT SERPL-MCNC: 6.6 G/DL
RBC # BLD AUTO: 4.71 M/UL
SODIUM SERPL-SCNC: 142 MMOL/L
T4 FREE SERPL-MCNC: 0.81 NG/DL
TRIGL SERPL-MCNC: 206 MG/DL
TSH SERPL DL<=0.005 MIU/L-ACNC: 1.3 UIU/ML
WBC # BLD AUTO: 7.61 K/UL

## 2019-01-14 PROCEDURE — 80053 COMPREHEN METABOLIC PANEL: CPT

## 2019-01-14 PROCEDURE — 84439 ASSAY OF FREE THYROXINE: CPT

## 2019-01-14 PROCEDURE — 80061 LIPID PANEL: CPT

## 2019-01-14 PROCEDURE — 85025 COMPLETE CBC W/AUTO DIFF WBC: CPT

## 2019-01-14 PROCEDURE — 99214 OFFICE O/P EST MOD 30 MIN: CPT | Mod: S$GLB,,, | Performed by: FAMILY MEDICINE

## 2019-01-14 PROCEDURE — 99999 PR PBB SHADOW E&M-EST. PATIENT-LVL III: ICD-10-PCS | Mod: PBBFAC,,, | Performed by: FAMILY MEDICINE

## 2019-01-14 PROCEDURE — 99214 PR OFFICE/OUTPT VISIT, EST, LEVL IV, 30-39 MIN: ICD-10-PCS | Mod: S$GLB,,, | Performed by: FAMILY MEDICINE

## 2019-01-14 PROCEDURE — 99999 PR PBB SHADOW E&M-EST. PATIENT-LVL III: CPT | Mod: PBBFAC,,, | Performed by: FAMILY MEDICINE

## 2019-01-14 PROCEDURE — 84443 ASSAY THYROID STIM HORMONE: CPT

## 2019-01-14 PROCEDURE — 36415 COLL VENOUS BLD VENIPUNCTURE: CPT | Mod: PO

## 2019-01-14 RX ORDER — DICYCLOMINE HYDROCHLORIDE 10 MG/1
CAPSULE ORAL
Refills: 5 | COMMUNITY
Start: 2018-12-19

## 2019-01-14 RX ORDER — TEMAZEPAM 30 MG/1
CAPSULE ORAL
Qty: 30 CAPSULE | Refills: 3 | Status: SHIPPED | OUTPATIENT
Start: 2019-01-14

## 2019-01-14 RX ORDER — AMLODIPINE BESYLATE 5 MG/1
TABLET ORAL
Qty: 90 TABLET | Refills: 2 | Status: SHIPPED | OUTPATIENT
Start: 2019-01-14

## 2019-01-14 RX ORDER — OMEPRAZOLE 40 MG/1
CAPSULE, DELAYED RELEASE ORAL
Refills: 6 | COMMUNITY
Start: 2018-12-19

## 2019-01-14 RX ORDER — MONTELUKAST SODIUM 4 MG/1
TABLET, CHEWABLE ORAL
Refills: 5 | COMMUNITY
Start: 2018-12-19

## 2019-01-14 NOTE — PROGRESS NOTES
Chief Complaint   Patient presents with    Hypertension    Medication Refill       HPI  Keith Sommers . is a 38 y.o. male with multiple medical diagnoses as listed in the medical history and problem list that presents for follow up.    GERD/PUD - followed by GI, overall stable.    Insomnia - previous restoril now ineffective.    Gout - also stable at this time with improved diet, etoh use.     Pt is known to me and was last seen by me on 11/15/2017.    PAST MEDICAL HISTORY:  Past Medical History:   Diagnosis Date    Diverticulitis     Gout     Hypertension        PAST SURGICAL HISTORY:  Past Surgical History:   Procedure Laterality Date    CHOLECYSTECTOMY      CHOLECYSTECTOMY-LAPAROSCOPIC N/A 8/31/2017    [facility]    HERNIA REPAIR         SOCIAL HISTORY:  Social History     Socioeconomic History    Marital status: Single     Spouse name: Not on file    Number of children: Not on file    Years of education: Not on file    Highest education level: Not on file   Social Needs    Financial resource strain: Not on file    Food insecurity - worry: Not on file    Food insecurity - inability: Not on file    Transportation needs - medical: Not on file    Transportation needs - non-medical: Not on file   Occupational History    Not on file   Tobacco Use    Smoking status: Never Smoker    Smokeless tobacco: Never Used   Substance and Sexual Activity    Alcohol use: No     Comment: social. no alcohol tonight    Drug use: No    Sexual activity: Yes     Partners: Female     Comment: In relationship 9/7/17   Other Topics Concern    Not on file   Social History Narrative    Not on file       FAMILY HISTORY:  No family history on file.    ALLERGIES AND MEDICATIONS: updated and reviewed.  Review of patient's allergies indicates:  No Known Allergies  Current Outpatient Medications   Medication Sig Dispense Refill    amLODIPine (NORVASC) 5 MG tablet TAKE 1 TABLET(5  "MG) BY MOUTH EVERY DAY 90 tablet 2    colestipol (COLESTID) 1 gram Tab TK 1 T PO BID UTD  5    dicyclomine (BENTYL) 10 MG capsule TK 1 C PO TID PRN  5    MITIGARE 0.6 mg Cap Take 1 capsule (0.6 mg total) by mouth 3 (three) times daily. 270 capsule 0    omeprazole (PRILOSEC) 40 MG capsule TK 1 C PO QAM UTD  6    predniSONE (DELTASONE) 10 MG tablet SEE NOTES 40 tablet 0    temazepam (RESTORIL) 30 mg capsule TAKE 1 CAPSULE BY MOUTH EVERY NIGHT AS NEEDED FOR INSOMNIA 30 capsule 3    ULORIC 40 mg Tab Take 1 tablet (40 mg total) by mouth once daily. 30 tablet 6     No current facility-administered medications for this visit.        ROS  Review of Systems   Constitutional: Negative for activity change, fatigue and fever.   HENT: Negative for congestion, rhinorrhea and sore throat.    Eyes: Negative for visual disturbance.   Respiratory: Negative for cough, chest tightness and shortness of breath.    Cardiovascular: Negative for chest pain.   Gastrointestinal: Negative for abdominal pain, diarrhea, nausea and vomiting.   Genitourinary: Negative for dysuria, frequency and urgency.   Musculoskeletal: Negative for back pain and myalgias.   Skin: Negative for rash.   Neurological: Negative for dizziness, syncope and numbness.   Psychiatric/Behavioral: Negative for agitation.       Physical Exam  Vitals:    01/14/19 0810   BP: (!) 134/90   Pulse: (!) 52   Resp: 20   Temp: 97.5 °F (36.4 °C)    Body mass index is 38.92 kg/m².  Weight: 133.8 kg (294 lb 15.6 oz)   Height: 6' 1" (185.4 cm)     Physical Exam   Constitutional: He is oriented to person, place, and time. He appears well-developed and well-nourished.   HENT:   Head: Normocephalic and atraumatic.   Eyes: Conjunctivae and EOM are normal. Pupils are equal, round, and reactive to light.   Neck: Normal range of motion. Neck supple.   Cardiovascular: Normal rate, regular rhythm and normal heart sounds.   Pulmonary/Chest: Effort normal and breath sounds normal. "   Abdominal: Soft. Bowel sounds are normal.   Musculoskeletal: Normal range of motion.   Neurological: He is alert and oriented to person, place, and time. He has normal reflexes.   Skin: Skin is warm and dry.   Psychiatric: He has a normal mood and affect. His behavior is normal. Judgment and thought content normal.       Health Maintenance       Date Due Completion Date    TETANUS VACCINE 06/17/1999 ---    Influenza Vaccine 08/01/2018 ---    Lipid Panel 10/18/2021 10/18/2016          Assessment & Plan    Idiopathic chronic gout of right foot without tophus  - Continue current medication regimen as prescribed    Hypertension, unspecified type  -     amLODIPine (NORVASC) 5 MG tablet; TAKE 1 TABLET(5 MG) BY MOUTH EVERY DAY  Dispense: 90 tablet; Refill: 2  -     T4, free; Future; Expected date: 01/14/2019  -     TSH; Future; Expected date: 01/14/2019  -     CBC auto differential; Future; Expected date: 01/14/2019  -     Comprehensive metabolic panel; Future; Expected date: 01/14/2019  -     Lipid panel; Future; Expected date: 01/14/2019  - Continue current medication regimen as prescribed    Primary insomnia  -     temazepam (RESTORIL) 30 mg capsule; TAKE 1 CAPSULE BY MOUTH EVERY NIGHT AS NEEDED FOR INSOMNIA  Dispense: 30 capsule; Refill: 3        Follow-up in about 3 months (around 4/14/2019), or if symptoms worsen or fail to improve.

## 2019-02-11 DIAGNOSIS — M10.9 GOUTY ARTHROPATHY: ICD-10-CM

## 2019-02-11 RX ORDER — PREDNISONE 10 MG/1
TABLET ORAL
Qty: 40 TABLET | Refills: 0 | Status: SHIPPED | OUTPATIENT
Start: 2019-02-11

## 2019-11-05 ENCOUNTER — TELEPHONE (OUTPATIENT)
Dept: FAMILY MEDICINE | Facility: CLINIC | Age: 39
End: 2019-11-05

## 2019-11-05 NOTE — TELEPHONE ENCOUNTER
Recd' request for patient medical records from [facility], request mailed to [facility].

## 2019-11-19 DIAGNOSIS — F51.01 PRIMARY INSOMNIA: ICD-10-CM

## 2019-11-19 RX ORDER — TEMAZEPAM 30 MG/1
CAPSULE ORAL
Qty: 30 CAPSULE | Refills: 3 | Status: CANCELLED | OUTPATIENT
Start: 2019-11-19

## 2021-07-01 ENCOUNTER — PATIENT MESSAGE (OUTPATIENT)
Dept: ADMINISTRATIVE | Facility: OTHER | Age: 41
End: 2021-07-01

## 2022-10-03 ENCOUNTER — HOSPITAL ENCOUNTER (EMERGENCY)
Facility: HOSPITAL | Age: 42
Discharge: HOME | End: 2022-10-03
Attending: EMERGENCY MEDICINE
Payer: MEDICAID

## 2022-10-03 VITALS
HEART RATE: 47 BPM | OXYGEN SATURATION: 98 % | RESPIRATION RATE: 20 BRPM | WEIGHT: 230 LBS | TEMPERATURE: 99 F | SYSTOLIC BLOOD PRESSURE: 166 MMHG | DIASTOLIC BLOOD PRESSURE: 107 MMHG | HEIGHT: 73 IN | BODY MASS INDEX: 30.48 KG/M2

## 2022-10-03 DIAGNOSIS — R07.9 CHEST PAIN, UNSPECIFIED TYPE: Primary | ICD-10-CM

## 2022-10-03 DIAGNOSIS — R07.9 CHEST PAIN WITH LOW RISK FOR CARDIAC ETIOLOGY: ICD-10-CM

## 2022-10-03 LAB
ALBUMIN SERPL BCP-MCNC: 4.9 G/DL (ref 3.5–5.2)
ALP SERPL-CCNC: 69 U/L (ref 55–135)
ALT SERPL W/O P-5'-P-CCNC: 34 U/L (ref 10–44)
ANION GAP SERPL CALC-SCNC: 12 MMOL/L (ref 8–16)
AST SERPL-CCNC: 30 U/L (ref 10–40)
BASOPHILS # BLD AUTO: 0.06 K/UL (ref 0–0.2)
BASOPHILS NFR BLD: 0.7 % (ref 0–1.9)
BILIRUB SERPL-MCNC: 1.8 MG/DL (ref 0.1–1)
BNP SERPL-MCNC: <10 PG/ML (ref 0–99)
BUN SERPL-MCNC: 8 MG/DL (ref 6–20)
CALCIUM SERPL-MCNC: 9.9 MG/DL (ref 8.7–10.5)
CHLORIDE SERPL-SCNC: 101 MMOL/L (ref 95–110)
CO2 SERPL-SCNC: 26 MMOL/L (ref 23–29)
CREAT SERPL-MCNC: 1 MG/DL (ref 0.5–1.4)
DIFFERENTIAL METHOD: ABNORMAL
EOSINOPHIL # BLD AUTO: 0.1 K/UL (ref 0–0.5)
EOSINOPHIL NFR BLD: 1.6 % (ref 0–8)
ERYTHROCYTE [DISTWIDTH] IN BLOOD BY AUTOMATED COUNT: 11.6 % (ref 11.5–14.5)
EST. GFR  (NO RACE VARIABLE): >60 ML/MIN/1.73 M^2
GLUCOSE SERPL-MCNC: 108 MG/DL (ref 70–110)
HCT VFR BLD AUTO: 45.7 % (ref 40–54)
HGB BLD-MCNC: 17.1 G/DL (ref 14–18)
IMM GRANULOCYTES # BLD AUTO: 0.03 K/UL (ref 0–0.04)
IMM GRANULOCYTES NFR BLD AUTO: 0.4 % (ref 0–0.5)
LYMPHOCYTES # BLD AUTO: 2.4 K/UL (ref 1–4.8)
LYMPHOCYTES NFR BLD: 29.6 % (ref 18–48)
MCH RBC QN AUTO: 33.5 PG (ref 27–31)
MCHC RBC AUTO-ENTMCNC: 37.4 G/DL (ref 32–36)
MCV RBC AUTO: 90 FL (ref 82–98)
MONOCYTES # BLD AUTO: 1 K/UL (ref 0.3–1)
MONOCYTES NFR BLD: 12.3 % (ref 4–15)
NEUTROPHILS # BLD AUTO: 4.5 K/UL (ref 1.8–7.7)
NEUTROPHILS NFR BLD: 55.4 % (ref 38–73)
NRBC BLD-RTO: 0 /100 WBC
PLATELET # BLD AUTO: 158 K/UL (ref 150–450)
PMV BLD AUTO: 12.4 FL (ref 9.2–12.9)
POTASSIUM SERPL-SCNC: 3.1 MMOL/L (ref 3.5–5.1)
PROT SERPL-MCNC: 7.7 G/DL (ref 6–8.4)
RBC # BLD AUTO: 5.1 M/UL (ref 4.6–6.2)
SODIUM SERPL-SCNC: 139 MMOL/L (ref 136–145)
TROPONIN I SERPL DL<=0.01 NG/ML-MCNC: <0.006 NG/ML (ref 0–0.03)
WBC # BLD AUTO: 8.12 K/UL (ref 3.9–12.7)

## 2022-10-03 PROCEDURE — 25000003 PHARM REV CODE 250: Performed by: STUDENT IN AN ORGANIZED HEALTH CARE EDUCATION/TRAINING PROGRAM

## 2022-10-03 PROCEDURE — 99284 EMERGENCY DEPT VISIT MOD MDM: CPT | Mod: 25

## 2022-10-03 PROCEDURE — 83880 ASSAY OF NATRIURETIC PEPTIDE: CPT | Performed by: STUDENT IN AN ORGANIZED HEALTH CARE EDUCATION/TRAINING PROGRAM

## 2022-10-03 PROCEDURE — 84484 ASSAY OF TROPONIN QUANT: CPT | Performed by: STUDENT IN AN ORGANIZED HEALTH CARE EDUCATION/TRAINING PROGRAM

## 2022-10-03 PROCEDURE — 80053 COMPREHEN METABOLIC PANEL: CPT | Performed by: STUDENT IN AN ORGANIZED HEALTH CARE EDUCATION/TRAINING PROGRAM

## 2022-10-03 PROCEDURE — 85025 COMPLETE CBC W/AUTO DIFF WBC: CPT | Performed by: STUDENT IN AN ORGANIZED HEALTH CARE EDUCATION/TRAINING PROGRAM

## 2022-10-03 RX ORDER — ASPIRIN 325 MG
325 TABLET ORAL
Status: COMPLETED | OUTPATIENT
Start: 2022-10-03 | End: 2022-10-03

## 2022-10-03 RX ORDER — POTASSIUM CHLORIDE 20 MEQ/1
40 TABLET, EXTENDED RELEASE ORAL
Status: COMPLETED | OUTPATIENT
Start: 2022-10-03 | End: 2022-10-03

## 2022-10-03 RX ADMIN — ASPIRIN 325 MG ORAL TABLET 325 MG: 325 PILL ORAL at 10:10

## 2022-10-03 RX ADMIN — POTASSIUM CHLORIDE 40 MEQ: 1500 TABLET, EXTENDED RELEASE ORAL at 11:10

## 2022-10-03 NOTE — ED PROVIDER NOTES
"Encounter Date: 10/3/2022       History     Chief Complaint   Patient presents with    Chest Pain     Presents with 2 day h/o chest pain. Pain is intermittent, getting worse last night. Pt describes ' a wave of pressure pushing from back to front. Denies any radiation or SOB or dizziness. Denies any trauma.      42-year-old male with past medical history of hypertension, PUD and GERD presenting to ED with complaint of chest pain for last night.  He states that it is a "discomfort" that started in his back and feels like waves coming through to the front of his chest that lasts seconds.  He felt that it may be related to gas and waited until this morning.  When the pain persisted, he decided to come to the ED for further evaluation.  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  He has not injured himself recently at work though he did wake up yesterday morning with some dull shoulder pain which has now resolved.  He states that the pain does not change with position and is not exacerbated with exertion.  He denies any fevers shortness of breath, lightheadedness, headache, vision changes, numbness or weakness.  His wife states that he has had a very stressful month and has undiagnosed anxiety.  Patient reports that his father has history of MI with stenting and his maternal grandfather had an MI in his early 40s.  Patient smokes marijuana daily but denies using tobacco or recreational drugs.    Review of patient's allergies indicates:  No Known Allergies  Past Medical History:   Diagnosis Date    Diverticulitis     Gout     Hypertension      Past Surgical History:   Procedure Laterality Date    CHOLECYSTECTOMY      HERNIA REPAIR       History reviewed. No pertinent family history.  Social History     Tobacco Use    Smoking status: Never    Smokeless tobacco: Never   Substance Use Topics    Alcohol use: No     Comment: socially    Drug use: Yes     Types: Marijuana     Comment: " daily     Review of Systems   Constitutional:  Negative for chills and fever.   HENT:  Negative for congestion and sore throat.    Eyes:  Negative for pain and visual disturbance.   Respiratory:  Negative for cough and shortness of breath.    Cardiovascular:  Positive for chest pain. Negative for palpitations and leg swelling.   Gastrointestinal:  Negative for abdominal pain and nausea.   Genitourinary:  Negative for difficulty urinating and dysuria.   Musculoskeletal:  Negative for back pain and myalgias.   Skin:  Negative for rash and wound.   Neurological:  Negative for weakness, light-headedness and numbness.   Hematological:  Does not bruise/bleed easily.     Physical Exam     Initial Vitals [10/03/22 1009]   BP Pulse Resp Temp SpO2   (!) 139/91 81 16 98.3 °F (36.8 °C) 96 %      MAP       --         Physical Exam    Nursing note and vitals reviewed.  Constitutional: He is not diaphoretic. No distress.   HENT:   Head: Normocephalic and atraumatic.   Mouth/Throat: Oropharynx is clear and moist.   Eyes: Conjunctivae and EOM are normal.   Neck: Neck supple.   Normal range of motion.  Cardiovascular:  Normal rate, regular rhythm, normal heart sounds and intact distal pulses.           Pulmonary/Chest: Breath sounds normal. He has no wheezes. He has no rhonchi. He has no rales.   Abdominal: Abdomen is soft. He exhibits no distension. There is no abdominal tenderness.   Musculoskeletal:         General: No edema. Normal range of motion.      Cervical back: Normal range of motion and neck supple.     Neurological: He is alert and oriented to person, place, and time. He has normal strength. No cranial nerve deficit or sensory deficit.   Skin: Skin is warm and dry. Capillary refill takes less than 2 seconds.       ED Course   Procedures  Labs Reviewed   CBC W/ AUTO DIFFERENTIAL - Abnormal; Notable for the following components:       Result Value    MCH 33.5 (*)     MCHC 37.4 (*)     All other components within normal  limits   COMPREHENSIVE METABOLIC PANEL - Abnormal; Notable for the following components:    Potassium 3.1 (*)     Total Bilirubin 1.8 (*)     All other components within normal limits   TROPONIN I   B-TYPE NATRIURETIC PEPTIDE     EKG Readings: (Independently Interpreted)   This patient is in a normal sinus rhythm heart rate of 82.  There are no significant ST segment or T-wave changes.  There is poor R-wave progression across precordium.  The patient does have a borderline infiltrate intraventricular conduction delay.      Imaging Results              X-Ray Chest AP Portable (Final result)  Result time 10/03/22 10:52:06      Final result by Brett Diana III, MD (10/03/22 10:52:06)                   Impression:      No acute process seen.      Electronically signed by: Brett Diana MD  Date:    10/03/2022  Time:    10:52               Narrative:    EXAMINATION:  XR CHEST AP PORTABLE    CLINICAL HISTORY:  Chest Pain;    FINDINGS:  Chest one view portable.    Heart size is normal.  Lungs are clear.  The bones show nothing unusual.                                       Medications   aspirin tablet 325 mg (325 mg Oral Given 10/3/22 1023)   potassium chloride SA CR tablet 40 mEq (40 mEq Oral Given 10/3/22 1111)     Medical Decision Making:   History:   Old Medical Records: I decided to obtain old medical records.  Initial Assessment:   42-year-old male with past medical history of hypertension, PUD and GERD presenting to ED with complaint of chest pain for last night.   Differential Diagnosis:   ACS  Anxiety  MCK  Electrolyte derangement  Clinical Tests:   Lab Tests: Ordered and Reviewed  Radiological Study: Ordered and Reviewed  ED Management:  Patient is hypertensive but otherwise hemodynamically stable and nontoxic appearing.  EKG shows no ST elevations. Cardiac workup initiated. CBC, troponin and BNP are all within normal limits. CMP notable for hypokalemia and mildly elevated total bilirubin. Potassium tabs  given. CXR demonstrates no acute cardiopulmonary processes. Patient received 325mg Aspirin.  On reassessment, patient reports symptoms have improved. Patient advised to schedule close follow-up with their PCP.  Referral sent for cardiology follow-up.  Patient verbalized understanding and agreement with plan. All questions answered. Patient discharged home with strict return precautions.    Additional MDM:   Heart Score:    History:          Slightly suspicious.  ECG:             Normal  Age:               Less than 45 years  Risk factors: 1-2 risk factors  Troponin:       Less than or equal to normal limit  Final Score: 1          ED Course as of 10/03/22 1142   Mon Oct 03, 2022   1007 I reviewed the triage EKG and found that the patient had a heart rate of 80.  There is a left posterior fascicular block and there is no old EKGs to determine if this is new or old.  I have asked the RN to bring the patient back emergently for evaluation. [MH]      ED Course User Index  [MH] Kaitlin Carmichael MD          Resident staff attestation:  This is doctor Nadya dictating.  I evaluated this patient.  He has very vague chest pain that comes and goes in episodes lasting seconds.  He does have a strong family history.  His evaluation was negative for acute myocardial infarction today.  He is very low risk for pulmonary embolus.  He is not short of breath.  He is not tachycardic.  His respiratory rate is 18.  He is perc negative.  His heart score is very low.  I will discharge to outpatient evaluation and treatment.           Clinical Impression:   Final diagnoses:  [R07.9] Chest pain, unspecified type (Primary)  [R07.9] Chest pain with low risk for cardiac etiology      ED Disposition Condition    Discharge Stable          ED Prescriptions    None       Follow-up Information       Follow up With Specialties Details Why Contact [facility] Emergency Dept Emergency Medicine  As needed, If symptoms worsen [address]  [address]  [facility] [address]  [phone]    Erasto Aden MD Cardiology In 3 days  [address]  [address]  [facility] [address]  [phone]               Sonam Quintanilla MD  Resident  10/03/22 1143

## 2022-10-03 NOTE — DISCHARGE INSTRUCTIONS
Diagnosis:   1. Chest pain, unspecified type        Home Care Instructions:  - Medications: Continue taking your home medications as prescribed  - Your potassium level was mildly low. You should continue eating foods with high potassium content or a daily multivitamin.     Follow-Up Plan:  - Follow-up with: Primary care doctor within 3 days. Referral was sent for you to schedule follow-up with cardiology as well.   - Additional testing and/or evaluation will be directed by your primary doctor    Return to the Emergency Department for symptoms including but not limited to: worsening symptoms, severe back pain, shortness of breath or chest pain, vomiting with inability to hold down fluids, blood in vomit or poop, fevers greater than 100.4°F, passing out/fainting/unconsciousness, or other concerning symptoms.

## 2022-10-03 NOTE — ED TRIAGE NOTES
Did not feel well yesterday. Began with pulsing discomfort from the back to the front starting last night. Continues to come & go. Doesn't feel like his usual GERD issues. EKG obtained. Placed on cardiac monitor.

## 2025-01-18 ENCOUNTER — HOSPITAL ENCOUNTER (EMERGENCY)
Facility: HOSPITAL | Age: 45
Discharge: PSYCHIATRIC OR SUBSTANCE USE FACILITY | End: 2025-01-19
Attending: EMERGENCY MEDICINE

## 2025-01-18 DIAGNOSIS — Z00.8 MEDICAL CLEARANCE FOR PSYCHIATRIC ADMISSION: ICD-10-CM

## 2025-01-18 DIAGNOSIS — F10.920 ALCOHOLIC INTOXICATION WITHOUT COMPLICATION: ICD-10-CM

## 2025-01-18 DIAGNOSIS — R45.851 SUICIDAL IDEATION: Primary | ICD-10-CM

## 2025-01-18 DIAGNOSIS — M10.9 ACUTE GOUT OF RIGHT FOOT, UNSPECIFIED CAUSE: ICD-10-CM

## 2025-01-18 LAB
ALBUMIN SERPL BCP-MCNC: 4 G/DL (ref 3.5–5.2)
ALP SERPL-CCNC: 84 U/L (ref 40–150)
ALT SERPL W/O P-5'-P-CCNC: 53 U/L (ref 10–44)
AMPHET+METHAMPHET UR QL: NEGATIVE
ANION GAP SERPL CALC-SCNC: 13 MMOL/L (ref 8–16)
APAP SERPL-MCNC: <3 UG/ML (ref 10–20)
AST SERPL-CCNC: 49 U/L (ref 10–40)
BARBITURATES UR QL SCN>200 NG/ML: NEGATIVE
BASOPHILS # BLD AUTO: 0.09 K/UL (ref 0–0.2)
BASOPHILS NFR BLD: 1.1 % (ref 0–1.9)
BENZODIAZ UR QL SCN>200 NG/ML: NEGATIVE
BILIRUB SERPL-MCNC: 0.6 MG/DL (ref 0.1–1)
BILIRUB UR QL STRIP: NEGATIVE
BUN SERPL-MCNC: 7 MG/DL (ref 6–20)
BZE UR QL SCN: NEGATIVE
CALCIUM SERPL-MCNC: 8.8 MG/DL (ref 8.7–10.5)
CANNABINOIDS UR QL SCN: ABNORMAL
CHLORIDE SERPL-SCNC: 106 MMOL/L (ref 95–110)
CLARITY UR: CLEAR
CO2 SERPL-SCNC: 24 MMOL/L (ref 23–29)
COLOR UR: YELLOW
CREAT SERPL-MCNC: 0.8 MG/DL (ref 0.5–1.4)
CREAT UR-MCNC: 77.9 MG/DL (ref 23–375)
DIFFERENTIAL METHOD BLD: ABNORMAL
EOSINOPHIL # BLD AUTO: 0.3 K/UL (ref 0–0.5)
EOSINOPHIL NFR BLD: 3.2 % (ref 0–8)
ERYTHROCYTE [DISTWIDTH] IN BLOOD BY AUTOMATED COUNT: 13 % (ref 11.5–14.5)
EST. GFR  (NO RACE VARIABLE): >60 ML/MIN/1.73 M^2
ETHANOL SERPL-MCNC: 110 MG/DL
ETHANOL SERPL-MCNC: 173 MG/DL
GLUCOSE SERPL-MCNC: 93 MG/DL (ref 70–110)
GLUCOSE UR QL STRIP: NEGATIVE
HCT VFR BLD AUTO: 48.4 % (ref 40–54)
HGB BLD-MCNC: 17.2 G/DL (ref 14–18)
HGB UR QL STRIP: NEGATIVE
IMM GRANULOCYTES # BLD AUTO: 0.04 K/UL (ref 0–0.04)
IMM GRANULOCYTES NFR BLD AUTO: 0.5 % (ref 0–0.5)
KETONES UR QL STRIP: NEGATIVE
LEUKOCYTE ESTERASE UR QL STRIP: NEGATIVE
LYMPHOCYTES # BLD AUTO: 3.1 K/UL (ref 1–4.8)
LYMPHOCYTES NFR BLD: 38.3 % (ref 18–48)
MCH RBC QN AUTO: 33.5 PG (ref 27–31)
MCHC RBC AUTO-ENTMCNC: 35.5 G/DL (ref 32–36)
MCV RBC AUTO: 94 FL (ref 82–98)
METHADONE UR QL SCN>300 NG/ML: NEGATIVE
MONOCYTES # BLD AUTO: 1 K/UL (ref 0.3–1)
MONOCYTES NFR BLD: 12.7 % (ref 4–15)
NEUTROPHILS # BLD AUTO: 3.6 K/UL (ref 1.8–7.7)
NEUTROPHILS NFR BLD: 44.2 % (ref 38–73)
NITRITE UR QL STRIP: NEGATIVE
NRBC BLD-RTO: 0 /100 WBC
OPIATES UR QL SCN: NEGATIVE
PCP UR QL SCN>25 NG/ML: NEGATIVE
PH UR STRIP: 7 [PH] (ref 5–8)
PLATELET # BLD AUTO: 173 K/UL (ref 150–450)
PMV BLD AUTO: 10.5 FL (ref 9.2–12.9)
POTASSIUM SERPL-SCNC: 3.5 MMOL/L (ref 3.5–5.1)
PROT SERPL-MCNC: 7.2 G/DL (ref 6–8.4)
PROT UR QL STRIP: NEGATIVE
RBC # BLD AUTO: 5.14 M/UL (ref 4.6–6.2)
SODIUM SERPL-SCNC: 143 MMOL/L (ref 136–145)
SP GR UR STRIP: 1.01 (ref 1–1.03)
TOXICOLOGY INFORMATION: ABNORMAL
URN SPEC COLLECT METH UR: NORMAL
UROBILINOGEN UR STRIP-ACNC: NEGATIVE EU/DL
WBC # BLD AUTO: 8.12 K/UL (ref 3.9–12.7)

## 2025-01-18 PROCEDURE — 25000003 PHARM REV CODE 250

## 2025-01-18 PROCEDURE — 85025 COMPLETE CBC W/AUTO DIFF WBC: CPT | Performed by: EMERGENCY MEDICINE

## 2025-01-18 PROCEDURE — 80053 COMPREHEN METABOLIC PANEL: CPT | Performed by: EMERGENCY MEDICINE

## 2025-01-18 PROCEDURE — G0427 INPT/ED TELECONSULT70: HCPCS | Mod: GT,,, | Performed by: STUDENT IN AN ORGANIZED HEALTH CARE EDUCATION/TRAINING PROGRAM

## 2025-01-18 PROCEDURE — 82077 ASSAY SPEC XCP UR&BREATH IA: CPT | Performed by: EMERGENCY MEDICINE

## 2025-01-18 PROCEDURE — 99285 EMERGENCY DEPT VISIT HI MDM: CPT

## 2025-01-18 PROCEDURE — 63600175 PHARM REV CODE 636 W HCPCS

## 2025-01-18 PROCEDURE — 81003 URINALYSIS AUTO W/O SCOPE: CPT | Performed by: EMERGENCY MEDICINE

## 2025-01-18 PROCEDURE — 80143 DRUG ASSAY ACETAMINOPHEN: CPT | Performed by: EMERGENCY MEDICINE

## 2025-01-18 PROCEDURE — 80307 DRUG TEST PRSMV CHEM ANLYZR: CPT | Performed by: EMERGENCY MEDICINE

## 2025-01-18 RX ORDER — PREDNISONE 20 MG/1
TABLET ORAL
Status: COMPLETED
Start: 2025-01-18 | End: 2025-01-18

## 2025-01-18 RX ORDER — LORAZEPAM 2 MG/ML
2 INJECTION INTRAMUSCULAR
Status: DISCONTINUED | OUTPATIENT
Start: 2025-01-18 | End: 2025-01-18

## 2025-01-18 RX ORDER — ONDANSETRON 4 MG/1
4 TABLET, ORALLY DISINTEGRATING ORAL
Status: COMPLETED | OUTPATIENT
Start: 2025-01-18 | End: 2025-01-18

## 2025-01-18 RX ORDER — COLCHICINE 0.6 MG/1
0.6 TABLET, FILM COATED ORAL
Status: COMPLETED | OUTPATIENT
Start: 2025-01-18 | End: 2025-01-18

## 2025-01-18 RX ORDER — AMLODIPINE BESYLATE 5 MG/1
TABLET ORAL
Status: COMPLETED
Start: 2025-01-18 | End: 2025-01-18

## 2025-01-18 RX ORDER — LORAZEPAM 2 MG/ML
2 INJECTION INTRAMUSCULAR EVERY 4 HOURS PRN
Status: DISCONTINUED | OUTPATIENT
Start: 2025-01-18 | End: 2025-01-19 | Stop reason: HOSPADM

## 2025-01-18 RX ORDER — FAMOTIDINE 20 MG/1
TABLET, FILM COATED ORAL
Status: COMPLETED
Start: 2025-01-18 | End: 2025-01-18

## 2025-01-18 RX ORDER — COLCHICINE 0.6 MG/1
TABLET, FILM COATED ORAL
Status: COMPLETED
Start: 2025-01-18 | End: 2025-01-18

## 2025-01-18 RX ORDER — PREDNISONE 20 MG/1
40 TABLET ORAL
Status: DISCONTINUED | OUTPATIENT
Start: 2025-01-18 | End: 2025-01-18

## 2025-01-18 RX ORDER — HALOPERIDOL 5 MG/ML
5 INJECTION INTRAMUSCULAR
Status: DISCONTINUED | OUTPATIENT
Start: 2025-01-18 | End: 2025-01-18

## 2025-01-18 RX ORDER — FAMOTIDINE 20 MG/1
20 TABLET, FILM COATED ORAL
Status: COMPLETED | OUTPATIENT
Start: 2025-01-18 | End: 2025-01-18

## 2025-01-18 RX ORDER — DIPHENHYDRAMINE HYDROCHLORIDE 50 MG/ML
50 INJECTION INTRAMUSCULAR; INTRAVENOUS EVERY 4 HOURS PRN
Status: DISCONTINUED | OUTPATIENT
Start: 2025-01-18 | End: 2025-01-19 | Stop reason: HOSPADM

## 2025-01-18 RX ORDER — PREDNISONE 20 MG/1
40 TABLET ORAL DAILY
Qty: 8 TABLET | Refills: 0 | Status: SHIPPED | OUTPATIENT
Start: 2025-01-18 | End: 2025-01-22

## 2025-01-18 RX ORDER — DIPHENHYDRAMINE HYDROCHLORIDE 50 MG/ML
25 INJECTION INTRAMUSCULAR; INTRAVENOUS
Status: DISCONTINUED | OUTPATIENT
Start: 2025-01-18 | End: 2025-01-18

## 2025-01-18 RX ORDER — ONDANSETRON 4 MG/1
TABLET, ORALLY DISINTEGRATING ORAL
Status: COMPLETED
Start: 2025-01-18 | End: 2025-01-18

## 2025-01-18 RX ORDER — HALOPERIDOL 5 MG/ML
5 INJECTION INTRAMUSCULAR EVERY 4 HOURS PRN
Status: DISCONTINUED | OUTPATIENT
Start: 2025-01-18 | End: 2025-01-19 | Stop reason: HOSPADM

## 2025-01-18 RX ORDER — AMLODIPINE BESYLATE 5 MG/1
5 TABLET ORAL
Status: COMPLETED | OUTPATIENT
Start: 2025-01-18 | End: 2025-01-18

## 2025-01-18 RX ORDER — PREDNISONE 20 MG/1
20 TABLET ORAL
Status: COMPLETED | OUTPATIENT
Start: 2025-01-18 | End: 2025-01-18

## 2025-01-18 RX ADMIN — COLCHICINE 0.6 MG: 0.6 TABLET, FILM COATED ORAL at 11:01

## 2025-01-18 RX ADMIN — PREDNISONE 20 MG: 20 TABLET ORAL at 11:01

## 2025-01-18 RX ADMIN — ONDANSETRON 4 MG: 4 TABLET, ORALLY DISINTEGRATING ORAL at 11:01

## 2025-01-18 RX ADMIN — AMLODIPINE BESYLATE 5 MG: 5 TABLET ORAL at 11:01

## 2025-01-18 RX ADMIN — FAMOTIDINE 20 MG: 20 TABLET, FILM COATED ORAL at 11:01

## 2025-01-19 VITALS
OXYGEN SATURATION: 95 % | BODY MASS INDEX: 33.13 KG/M2 | DIASTOLIC BLOOD PRESSURE: 115 MMHG | TEMPERATURE: 98 F | HEIGHT: 73 IN | WEIGHT: 250 LBS | RESPIRATION RATE: 20 BRPM | SYSTOLIC BLOOD PRESSURE: 160 MMHG | HEART RATE: 105 BPM

## 2025-01-19 PROCEDURE — 25000003 PHARM REV CODE 250: Performed by: EMERGENCY MEDICINE

## 2025-01-19 RX ORDER — ONDANSETRON 4 MG/1
4 TABLET, ORALLY DISINTEGRATING ORAL
Status: COMPLETED | OUTPATIENT
Start: 2025-01-19 | End: 2025-01-19

## 2025-01-19 RX ADMIN — ONDANSETRON 4 MG: 4 TABLET, ORALLY DISINTEGRATING ORAL at 01:01

## 2025-01-19 NOTE — ED NOTES
Called [facility] spoke to Zack to inform them of the transfer to [facility] [place]

## 2025-01-19 NOTE — CONSULTS
"[facility]  Psychiatry  Telepsychiatry Consult Note    Please see previous notes:    Patient agreeable to consultation via telepsychiatry.    Tele-Consultation from Psychiatry started: 1/18/2025 at 7:55 PM  The chief complaint leading to psychiatric consultation is: SI, intoxication  This consultation was requested by Dr. Faustino Mc, the Emergency Department attending physician.  The location of the consulting psychiatrist is  [place], [place] .  The patient location is  [facility] EMERGENCY DEPARTMENT       Patient Identification:   Keith Sommers Jr. is a 44 y.o. male.    Patient information was obtained from patient, spouse/SO, and parent.  Patient presented involuntarily to the Emergency Department    Inpatient consult to Telemedicine - Psychiatry  Consult performed by: Lee Dunham MD  Consult ordered by: Faustino cM MD  Reason for consult: SI, intoxicated        Consult Start Time: 01/18/2025 19:55 CST  Consult End Time: 01/18/2025 22:00 CST        Subjective:     History of Present Illness:  Triage:  Pt to ED in custody of [place] Officers after being called out by Pt's wife who reports pt was threatening to kill himself earlier today. Per PSO "pt texted his wife that he was going to drive into a river". Pt also endorses ETOH    ED Attending:  4 y.o. male with a PMHx of Gout and HTN presents to the ED via police for evaluation of suicidal ideation. Police report they received a call from ex partner with concerns of self harm after receiving text messages from patient threatening to drive his car into a ditch. Patient reports his ex showed up to his house today at 7am causing an argument, he reports taking two shots of vodka afterwards. Patient also reports he was sleeping in his bed some time later in the day when his ex showed up again, starting another argument after which he reports driving to a friends house for comfort and the ex followed him. Patient denies sending any text messages. " "Stating he repeatedly asked Ex to leave him alone. Patient states he was in his bed sleeping when the police showed up to his house. Patient denies any thoughts of self harm. Patient denies any associated symptoms. Patient does note that he has been feeling depressed lately and that he does see a therapist regularly. Patient states he has a follow up with his PCP to be put onto psychiatric medication. Patient states that he smokes cannabis occasionally. Police note he reports taking a benadryl today.     My interview:  Pt says last night he slept at a woman's house and his ex-wife didn't like. She confronted him this morning at his house "and my day started off pretty shitty." He says he was supposed to go to work this morning, but didn't go to work because he was so upset. He says he had a few shots of vodka because of his frustrations.     Pt notes his "brother" (best friend) killed himself in 2020 at 37 yo and "sister" (step-sister) killed herself in 2017 at 37 yo. He wanted to get away from ex-wife so he drove to his brother's driveway to be by himself, waited for ex-wife to leave his house, returned to take a nap, and she was gone. Then after 10 minutes, 4 officers pulled him out of bed and brought him to the hospital.    Admits "my mental health ain't the best in the world. My sister killed herself. My brother killed himself. Suicide is not an option for me. I know what it does to people, so that's not an option." Pt says he's "just not happy right now." Wants to isolate, says "I'm sad about a lot of things in my life and the path my life was going to go down." He says "I just want to be left alone." He says "I don't think I said that," about driving car into a ditch. Pt feels like therapy has been helpful to an extent, but "it makes me realize that life isn't all peaches and cream." Sees a therapist who wants him to start Paxil and has apt with PCP Tuesday. Previously took Wellbutrin which didn't work. Pt " "irritable and adamantly denies SI. Agrees with me speaking with ex-wife and father, but says they probably will not be helpful to talk to.    Cheng- ex-wife  [phone]  Spoke with pt's ex-wife over the phone. She reports "he's been on kind of a downhill spiral for the last 4 years." Pt blames self for best friend killing himself. She says recently she had to take his gun from him because she walked into his room and saw his gun on his bed. He threatened to reach out to the , so she ended up giving his father his gun. Today, pt texted the ex-wife of best friend who  that he was done and he promised he would pass down his pool sticks to his boys, but he was sorry he wasn't going to be able to do that. Instead, instructions would be given for someone else to do that. She reached out to Cheng who looked at her texts, saw pt gave her instructions as to where to allocate pool sticks. She went to his house and says pt barricaded himself in his room, she broke in and he wasn't moving. He finally woke up and was saying "just let me go. Let me be alone. Let me die." Pt then got up and said he was going to [place]. She tried to block him, but he got into his truck, drove to his best friend who 's house and sat that. He then told her to leave him alone and that he could get a gun tomorrow if he wanted. Saw tire tracks by the river, got scared, called the police, then went back to pt's home and saw he was sleeping. Police arrived and brought him to the hospital. Today, pt saw there was a knife on the bedside and pt told her if she hadn't taken gun, he would have probably killed himself today.     Keith Hinds.  [phone]  Spoke with pt's father over the phone. He says he's experienced a lot of loss and depression. Father says "he's not in his right mind right now. I'm worried about him. He is capable of doing something right now." Further, reports pt has a drinking problem as well. " "Pt has mentioned not wanting to be alive to father. He would like pt to be admitted to a psychiatric hospital.    Psychiatric History:   Previous Psychiatric Hospitalizations: No  Previous Medication Trials: Wellbutrin (didn't find it helpful)  Previous Suicide Attempts: no  History of Violence: denies  History of Depression: yes  History of Hemalatha: denies  History of Auditory/Visual Hallucination: denies  History of Delusions: denies  Outpatient psychiatrist (current & past): saw a therapist in  after brother  by suicide    Substance Abuse History:  Tobacco:No  Alcohol: Yes "I have 2 or 3 drinks and I'm good." 3-4 days a week  -stopped drinking for a few months until after New Years  Alcohol Use Disorder:  Had times when you ended up drinking more, or longer, than you intended?   More than once wanted to cut down or stop drinking, or tried to, but couldnt?   Spent a lot of time drinking? Or being sick or getting over other aftereffects?   Wanted a drink so badly you couldnt think of anything else?   Found that drinking--or being sick from drinking--often interfered with taking care of your home or family? Or caused job troubles? Or school problems?   Continued to drink even though it was causing trouble with your family or friends?   Given up or cut back on activities that were important or interesting to you, or gave you pleasure, in order to drink?   More than once gotten into situations while or after drinking that increased your chances of getting hurt (such as driving, swimming, using machinery, walking in a dangerous area, or having unsafe sex)?   Continued to drink even though it was making you feel depressed or anxious or adding to another health problem? Or after having had a memory blackout?   Had to drink much more than you once did to get the effect you want? Or found that your usual number of drinks had much less effect than before?   Found that when the effects of alcohol were wearing off, " "you had withdrawal symptoms, such as trouble sleeping, shakiness, restlessness, nausea, sweating, a racing heart, or a seizure? Or sensed things that were not there?   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    Relationship Status/Sexual Orientation: "sort of together, on-again-off again for the last year and a half" with a girl,  for 8-9 years,  for 2 years, "it's on-again-off-again"  Children:  3 (19 yo, 17 yo, 15 yo)  Housing Status:  lives with 19 yo son, sees younger step-children regularly     history:  NO  Access to gun: NO  Holiness: agnostic  Recreational activities: shoot pool, listening to music, hiking, watching football and other sports    Psychiatric Mental Status Exam:  Arousal: alert  Sensorium/Orientation: oriented to grossly intact  Behavior/Cooperation: normal, cooperative   Speech: normal tone, normal rate, normal pitch, normal volume  Language: grossly intact  Mood: depressed  Affect: appropriate  Thought Process: normal and logical  Thought Content:   Auditory hallucinations: NO  Visual hallucinations: NO  Paranoia: NO  Delusions:  NO  Suicidal ideation: denies, but ex-wife and father disagree  Homicidal ideation: NO  Attention/Concentration:  intact  Memory:    Recent:  Intact   Remote: Intact    Fund of Knowledge: Aware of current events   Abstract reasoning: proverbs were abstract  Insight: limited awareness of illness  Judgment: behavior is adequate to circumstances, impaired due to illness      Past Medical " History:   Past Medical History:   Diagnosis Date    Diverticulitis     Gout     Hypertension       Laboratory Data:   Labs Reviewed   CBC W/ AUTO DIFFERENTIAL - Abnormal       Result Value    WBC 8.12      RBC 5.14      Hemoglobin 17.2      Hematocrit 48.4      MCV 94      MCH 33.5 (*)     MCHC 35.5      RDW 13.0      Platelets 173      MPV 10.5      Immature Granulocytes 0.5      Gran # (ANC) 3.6      Immature Grans (Abs) 0.04      Lymph # 3.1      Mono # 1.0      Eos # 0.3      Baso # 0.09      nRBC 0      Gran % 44.2      Lymph % 38.3      Mono % 12.7      Eosinophil % 3.2      Basophil % 1.1      Differential Method Automated     COMPREHENSIVE METABOLIC PANEL - Abnormal    Sodium 143      Potassium 3.5      Chloride 106      CO2 24      Glucose 93      BUN 7      Creatinine 0.8      Calcium 8.8      Total Protein 7.2      Albumin 4.0      Total Bilirubin 0.6      Alkaline Phosphatase 84      AST 49 (*)     ALT 53 (*)     eGFR >60      Anion Gap 13     ALCOHOL,MEDICAL (ETHANOL) - Abnormal    Alcohol, Serum 173 (*)    ACETAMINOPHEN LEVEL - Abnormal    Acetaminophen (Tylenol), Serum <3.0 (*)    URINALYSIS, REFLEX TO URINE CULTURE   DRUG SCREEN PANEL, URINE EMERGENCY       Neurological History:  Seizures: No  Head trauma: No    Allergies:   Review of patient's allergies indicates:  No Known Allergies    Medications in ER:   Medications   haloperidol lactate injection 5 mg (has no administration in time range)   diphenhydrAMINE injection 25 mg (has no administration in time range)   LORazepam injection 2 mg (has no administration in time range)       Medications at home: Amlodipine, Prednisone, PRN colchicine, Temazepam    No new subjective & objective note has been filed under this hospital service since the last note was generated.      Assessment - Diagnosis - Goals:     Diagnosis/Impression:   MDD, recurrent, severe  R/o Alcohol Use Disorder    Rec:   PEC and psych hospitalization for danger to self     Plan of  Care communicated to: pt and ED attending    More than 50% of the time was spent counseling/coordinating care    Consulting clinician was informed of the encounter and consult note.    Consultation ended: 1/18/2025 at 10:00 PM    Total time, including chart review, time with patient, obtaining collateral info[if possible]: 125 minutes         Lee Dunham MD   Psychiatry  [facility]

## 2025-01-19 NOTE — ED NOTES
Called [facility] spoke with Britta@ 9:23 pm, the paperwork has been scanned into pt. Chart and faxed over.

## 2025-01-19 NOTE — ED PROVIDER NOTES
"Encounter Date: 1/18/2025    SCRIBE #1 NOTE: I, Estefany Duran, am scribing for, and in the presence of,  Faustino Mc MD. Other sections scribed: HPI,ROS,PE.       History     Chief Complaint   Patient presents with    Suicidal     Pt to ED in custody of [place] Officers after being called out by Pt's wife who reports pt was threatening to kill himself earlier today. Per PSO "pt texted his wife that he was going to drive into a river". Pt also endorses ETOH.      44 y.o. male with a PMHx of Gout and HTN presents to the ED via police for evaluation of suicidal ideation. Police report they received a call from ex partner with concerns of self harm after receiving text messages from patient threatening to drive his car into a ditch. Patient reports his ex showed up to his house today at 7am causing an argument, he reports taking two shots of vodka afterwards. Patient also reports he was sleeping in his bed some time later in the day when his ex showed up again, starting another argument after which he reports driving to a friends house for comfort and the ex followed him. Patient denies sending any text messages. Stating he repeatedly asked Ex to leave him alone. Patient states he was in his bed sleeping when the police showed up to his house. Patient denies any thoughts of self harm. Patient denies any associated symptoms. Patient does note that he has been feeling depressed lately and that he does see a therapist regularly. Patient states he has a follow up with his PCP to be put onto psychiatric medication. Patient states that he smokes cannabis occasionally. Police note he reports taking a benadryl today.     The history is provided by the police and the patient. No  was used.     Review of patient's allergies indicates:  No Known Allergies  Past Medical History:   Diagnosis Date    Diverticulitis     Gout     Hypertension      Past Surgical History:   Procedure Laterality Date    " CHOLECYSTECTOMY      HERNIA REPAIR       No family history on file.  Social History     Tobacco Use    Smoking status: Never    Smokeless tobacco: Never   Substance Use Topics    Alcohol use: No     Comment: socially    Drug use: Yes     Types: Marijuana     Comment: daily     Review of Systems   Unable to perform ROS: Other (ROS limited due to agitation)   Psychiatric/Behavioral:  Positive for agitation.        Physical Exam     Initial Vitals [01/18/25 1838]   BP Pulse Resp Temp SpO2   (!) 179/125 (!) 118 20 98.7 °F (37.1 °C) 100 %      MAP       --         Physical Exam    Nursing note and vitals reviewed.  Constitutional: He appears well-developed. He is not diaphoretic. No distress.   HENT:   Head: Normocephalic.   Eyes: EOM are normal.   Cardiovascular:  Normal rate and regular rhythm.           No murmur heard.  Pulmonary/Chest: Effort normal and breath sounds normal. He has no wheezes.   Abdominal: Abdomen is soft. He exhibits no distension. There is no abdominal tenderness.   Musculoskeletal:         General: Normal range of motion.     Neurological: He is alert.   Skin: Skin is warm.   Psychiatric:   Patient awake and alert, affect agitated. No dysarthria. PE limited due to agitation.         ED Course   Procedures  Labs Reviewed   CBC W/ AUTO DIFFERENTIAL - Abnormal       Result Value    WBC 8.12      RBC 5.14      Hemoglobin 17.2      Hematocrit 48.4      MCV 94      MCH 33.5 (*)     MCHC 35.5      RDW 13.0      Platelets 173      MPV 10.5      Immature Granulocytes 0.5      Gran # (ANC) 3.6      Immature Grans (Abs) 0.04      Lymph # 3.1      Mono # 1.0      Eos # 0.3      Baso # 0.09      nRBC 0      Gran % 44.2      Lymph % 38.3      Mono % 12.7      Eosinophil % 3.2      Basophil % 1.1      Differential Method Automated     COMPREHENSIVE METABOLIC PANEL - Abnormal    Sodium 143      Potassium 3.5      Chloride 106      CO2 24      Glucose 93      BUN 7      Creatinine 0.8      Calcium 8.8      Total  Protein 7.2      Albumin 4.0      Total Bilirubin 0.6      Alkaline Phosphatase 84      AST 49 (*)     ALT 53 (*)     eGFR >60      Anion Gap 13     DRUG SCREEN PANEL, URINE EMERGENCY - Abnormal    Benzodiazepines Negative      Methadone metabolites Negative      Cocaine (Metab.) Negative      Opiate Scrn, Ur Negative      Barbiturate Screen, Ur Negative      Amphetamine Screen, Ur Negative      THC Presumptive Positive (*)     Phencyclidine Negative      Creatinine, Urine 77.9      Toxicology Information SEE COMMENT      Narrative:     Specimen Source->Urine   ALCOHOL,MEDICAL (ETHANOL) - Abnormal    Alcohol, Serum 173 (*)    ACETAMINOPHEN LEVEL - Abnormal    Acetaminophen (Tylenol), Serum <3.0 (*)    ALCOHOL,MEDICAL (ETHANOL) - Abnormal    Alcohol, Serum 110 (*)    URINALYSIS, REFLEX TO URINE CULTURE    Specimen UA Urine, Clean Catch      Color, UA Yellow      Appearance, UA Clear      pH, UA 7.0      Specific Gravity, UA 1.010      Protein, UA Negative      Glucose, UA Negative      Ketones, UA Negative      Bilirubin (UA) Negative      Occult Blood UA Negative      Nitrite, UA Negative      Urobilinogen, UA Negative      Leukocytes, UA Negative      Narrative:     Specimen Source->Urine          Imaging Results    None          Medications   haloperidol lactate injection 5 mg (has no administration in time range)   LORazepam injection 2 mg (has no administration in time range)   diphenhydrAMINE injection 50 mg (has no administration in time range)   colchicine capsule/tablet 0.6 mg (has no administration in time range)   amLODIPine tablet 5 mg (has no administration in time range)   famotidine tablet 20 mg (has no administration in time range)   ondansetron disintegrating tablet 4 mg (has no administration in time range)   predniSONE (DELTASONE) 20 MG tablet (has no administration in time range)   colchicine 0.6 mg capsule/tablet (has no administration in time range)   famotidine (PEPCID) 20 MG tablet (has no  administration in time range)   amLODIPine (NORVASC) 5 MG tablet (has no administration in time range)   ondansetron (ZOFRAN-ODT) 4 MG disintegrating tablet (has no administration in time range)   predniSONE tablet 20 mg (has no administration in time range)     Medical Decision Making  44-year-old male presenting with a police escort for possible suicidal ideation.  Per the patient's ex significant other the patient texted a friend that he was going to give away his pull sticks possibly in anticipation of losing his life.  Patient then barricaded himself in his home.  Patient then verbally told the ex-significant other that he no longer wanted to live.  The patient then gotten to his vehicle after stating he was going to drive his truck into the river.  The patient also told with the individual that he was going to purchase a gun to kill himself.  Patient was placed on a pec.  The patient did appear intoxicated.  The patient does not appear to have signs of alcohol withdrawal at this time.    The patient does state that he has been dealing with a gout flare of the right foot.  Patient does have erythema over the dorsum of the right foot with no obvious wounds.  Patient was provided prednisone, colchicine.  Patient requesting home meds of Prilosec and Uloric which are not in stock at our facility.  Replaced with Pepcid.  The patient was provided prednisone.      The patient is medically cleared for psychiatric evaluation.      Consultation with , psychiatry.  Agrees to continued the pec due to possible self-harm      Medical Decision Making:     A. Problem List:  1. Alcohol intoxication  2. Suicidal ideation     B. Differential diagnosis:    Alcohol intoxication, polysubstance abuse, at risk for impulsive acts, , suicidal ideation     C. Independent historians:   The patient's family who provided history with concern of possible suicidal ideation and threats.    Part of the note was done using electronic  dictation services.           Amount and/or Complexity of Data Reviewed  Labs: ordered. Decision-making details documented in ED Course.    Risk  Prescription drug management.            Scribe Attestation:   Scribe #1: I performed the above scribed service and the documentation accurately describes the services I performed. I attest to the accuracy of the note.        ED Course as of 01/18/25 2321   Sat Jan 18, 2025   1900 The patient was asking about the pec process.  I discussed that the patient will be placed on a pec pending further clarification as to what occurred.  Patient understands the process. [JM]   1911 Cheng, Ex girlfriend.      Keith texted Flora ex wife (dead best friend) instructions on what to do with his belongings.     What pool stick to give to what son. Possibly preparing for when he's not here. Bedroom door locked and barricaded with chair. He did not respond. He wasn't moving. Asking to let him die. He told her that he was going to drive his truck into the river. He got into his truck and started driving. She followed him. He told her that he was going to buy a gun.     He drove to his friend's home who passed away years ago. She then lost him. She saw tired marks near the river. [JM]   1943 Patient's father, mother, stepmother and ex-significant other presenting with the emergency room.  They appeared all to be on the same page.      They are concerned for the patient's mental health.  They are concerned that the patient may hurt himself.  Patient has made previous threats. [JM]   2111 Alcohol, Serum(!): 173 [JM]   2209 Dr. Dunham, psychiatry    Recommends continuing the pec after discussing with family. [JM]   2307 I rediscussed with the patient the process of the pec.  The patient is still appears to be upset that this is occurring.  I rediscussed the process multiple times about being transferred to a mental health facility for further care.  Patient appears to be upset.  Patient was  offered p.o. Ativan however he declined at this time.  The patient is lying in bed.  Patient is cooperative. [JM]   2309 Glucose: 93 [JM]   2319 Patient reports he has been taken 5 mg of p.r.n. prednisone over the last 2 days. [JM]      ED Course User Index  [JM] Faustino Mc MD       Medically cleared for psychiatry placement: 1/18/2025 11:08 PM               I, Dr. Faustino Mc, personally performed the services described in this documentation. All medical record entries made by the scribe were at my direction and in my presence.  I have reviewed the chart and agree that the record reflects my personal performance and is accurate and complete. Faustino Mc MD.  7:51 PM 01/18/2025     Clinical Impression:  Final diagnoses:  [R45.851] Suicidal ideation (Primary)  [F10.920] Alcoholic intoxication without complication  [M10.9] Acute gout of right foot, unspecified cause  [Z00.8] Medical clearance for psychiatric admission          ED Disposition Condition    Transfer to Psych Facility Stable          ED Prescriptions       Medication Sig Dispense Start Date End Date Auth. Provider    predniSONE (DELTASONE) 20 MG tablet Take 2 tablets (40 mg total) by mouth once daily. for 4 days 8 tablet 1/18/2025 1/22/2025 Faustino Mc MD          Follow-up Information    None          Faustino Mc MD  01/18/25 5024

## (undated) DEVICE — CANISTER SUCTION 2 LTR

## (undated) DEVICE — TUBING INSUFFLATION 10

## (undated) DEVICE — IRRIGATOR ENDOSCOPY DISP.

## (undated) DEVICE — APPLIER CLIP ENDO MED/LG 10MM

## (undated) DEVICE — CLOSURE SKIN STERI STRIP 1/2X4

## (undated) DEVICE — SCISSOR CURVED ENDOPATH 5MM

## (undated) DEVICE — DRESSING ADH ISLAND 2.5 X 3

## (undated) DEVICE — CHLORAPREP W TINT 26ML APPL

## (undated) DEVICE — TROCAR ENDOPATH XCEL 11MM 10CM

## (undated) DEVICE — BLADE SURG CARBON STEEL SZ11

## (undated) DEVICE — SEE MEDLINE ITEM 146292

## (undated) DEVICE — SUT MONOCRYL 4-0 PS-2

## (undated) DEVICE — SOL NS 1000CC

## (undated) DEVICE — NDL HYPO SAFETY 22 X 1 1/2

## (undated) DEVICE — GLOVE BIOGEL ECLIPSE SZ 7.5

## (undated) DEVICE — TROCAR ENDOPATH XCEL 5X100MM

## (undated) DEVICE — NDL INSUF ULTRA VERESS 120MM

## (undated) DEVICE — Device

## (undated) DEVICE — CLIPPER BLADE MOD 4406 (CAREF)

## (undated) DEVICE — KIT ANTIFOG

## (undated) DEVICE — PACK ENDOSCOPY GENERAL

## (undated) DEVICE — SYR 10CC LUER LOCK

## (undated) DEVICE — COVER OVERHEAD SURG LT BLUE

## (undated) DEVICE — ELECTRODE REM PLYHSV RETURN 9

## (undated) DEVICE — SEE MEDLINE ITEM 152622